# Patient Record
Sex: FEMALE | Race: WHITE | NOT HISPANIC OR LATINO | URBAN - METROPOLITAN AREA
[De-identification: names, ages, dates, MRNs, and addresses within clinical notes are randomized per-mention and may not be internally consistent; named-entity substitution may affect disease eponyms.]

---

## 2017-03-15 ENCOUNTER — INPATIENT (INPATIENT)
Facility: HOSPITAL | Age: 27
LOS: 3 days | Discharge: HOME | End: 2017-03-19
Attending: OBSTETRICS & GYNECOLOGY | Admitting: OBSTETRICS & GYNECOLOGY
Payer: COMMERCIAL

## 2017-03-15 VITALS — HEIGHT: 68 IN | WEIGHT: 169.76 LBS

## 2017-03-15 DIAGNOSIS — O34.21 MATERNAL CARE FOR SCAR FROM PREVIOUS CESAREAN DELIVERY: ICD-10-CM

## 2017-03-15 DIAGNOSIS — O34.219 MATERNAL CARE FOR UNSPECIFIED TYPE SCAR FROM PREVIOUS CESAREAN DELIVERY: ICD-10-CM

## 2017-03-15 LAB
BASOPHILS # BLD AUTO: 0 K/UL — SIGNIFICANT CHANGE UP (ref 0–0.2)
BASOPHILS NFR BLD AUTO: 0.2 % — SIGNIFICANT CHANGE UP (ref 0–2)
BLD GP AB SCN SERPL QL: NEGATIVE — SIGNIFICANT CHANGE UP
EOSINOPHIL # BLD AUTO: 0 K/UL — SIGNIFICANT CHANGE UP (ref 0–0.5)
EOSINOPHIL NFR BLD AUTO: 0.3 % — SIGNIFICANT CHANGE UP (ref 0–6)
HCT VFR BLD CALC: 39.7 % — SIGNIFICANT CHANGE UP (ref 34.5–45)
HGB BLD-MCNC: 13.5 G/DL — SIGNIFICANT CHANGE UP (ref 11.5–15.5)
LYMPHOCYTES # BLD AUTO: 18.3 % — SIGNIFICANT CHANGE UP (ref 13–44)
LYMPHOCYTES # BLD AUTO: 2 K/UL — SIGNIFICANT CHANGE UP (ref 1–3.3)
MCHC RBC-ENTMCNC: 31.5 PG — SIGNIFICANT CHANGE UP (ref 27–34)
MCHC RBC-ENTMCNC: 34 GM/DL — SIGNIFICANT CHANGE UP (ref 32–36)
MCV RBC AUTO: 92.6 FL — SIGNIFICANT CHANGE UP (ref 80–100)
MONOCYTES # BLD AUTO: 0.6 K/UL — SIGNIFICANT CHANGE UP (ref 0–0.9)
MONOCYTES NFR BLD AUTO: 5.7 % — SIGNIFICANT CHANGE UP (ref 2–14)
NEUTROPHILS # BLD AUTO: 8.4 K/UL — HIGH (ref 1.8–7.4)
NEUTROPHILS NFR BLD AUTO: 75.5 % — SIGNIFICANT CHANGE UP (ref 43–77)
PLATELET # BLD AUTO: 160 K/UL — SIGNIFICANT CHANGE UP (ref 150–400)
RBC # BLD: 4.28 M/UL — SIGNIFICANT CHANGE UP (ref 3.8–5.2)
RBC # FLD: 12.1 % — SIGNIFICANT CHANGE UP (ref 10.3–14.5)
RH IG SCN BLD-IMP: POSITIVE — SIGNIFICANT CHANGE UP
RH IG SCN BLD-IMP: POSITIVE — SIGNIFICANT CHANGE UP
T PALLIDUM AB TITR SER: NEGATIVE — SIGNIFICANT CHANGE UP
WBC # BLD: 11.1 K/UL — HIGH (ref 3.8–10.5)
WBC # FLD AUTO: 11.1 K/UL — HIGH (ref 3.8–10.5)

## 2017-03-15 PROCEDURE — 59514 CESAREAN DELIVERY ONLY: CPT | Mod: AS

## 2017-03-15 RX ORDER — SODIUM CHLORIDE 9 MG/ML
1000 INJECTION, SOLUTION INTRAVENOUS
Qty: 0 | Refills: 0 | Status: DISCONTINUED | OUTPATIENT
Start: 2017-03-15 | End: 2017-03-15

## 2017-03-15 RX ORDER — OXYTOCIN 10 UNIT/ML
333.33 VIAL (ML) INJECTION
Qty: 20 | Refills: 0 | Status: DISCONTINUED | OUTPATIENT
Start: 2017-03-15 | End: 2017-03-15

## 2017-03-15 RX ORDER — DIPHENHYDRAMINE HCL 50 MG
25 CAPSULE ORAL EVERY 6 HOURS
Qty: 0 | Refills: 0 | Status: DISCONTINUED | OUTPATIENT
Start: 2017-03-15 | End: 2017-03-19

## 2017-03-15 RX ORDER — LANOLIN
1 OINTMENT (GRAM) TOPICAL
Qty: 0 | Refills: 0 | Status: DISCONTINUED | OUTPATIENT
Start: 2017-03-15 | End: 2017-03-19

## 2017-03-15 RX ORDER — DOCUSATE SODIUM 100 MG
100 CAPSULE ORAL
Qty: 0 | Refills: 0 | Status: DISCONTINUED | OUTPATIENT
Start: 2017-03-15 | End: 2017-03-19

## 2017-03-15 RX ORDER — METOCLOPRAMIDE HCL 10 MG
10 TABLET ORAL ONCE
Qty: 0 | Refills: 0 | Status: DISCONTINUED | OUTPATIENT
Start: 2017-03-15 | End: 2017-03-15

## 2017-03-15 RX ORDER — HYDROMORPHONE HYDROCHLORIDE 2 MG/ML
30 INJECTION INTRAMUSCULAR; INTRAVENOUS; SUBCUTANEOUS
Qty: 0 | Refills: 0 | Status: DISCONTINUED | OUTPATIENT
Start: 2017-03-15 | End: 2017-03-16

## 2017-03-15 RX ORDER — SODIUM CHLORIDE 9 MG/ML
1000 INJECTION, SOLUTION INTRAVENOUS
Qty: 0 | Refills: 0 | Status: DISCONTINUED | OUTPATIENT
Start: 2017-03-15 | End: 2017-03-19

## 2017-03-15 RX ORDER — GLYCERIN ADULT
1 SUPPOSITORY, RECTAL RECTAL AT BEDTIME
Qty: 0 | Refills: 0 | Status: DISCONTINUED | OUTPATIENT
Start: 2017-03-15 | End: 2017-03-19

## 2017-03-15 RX ORDER — SIMETHICONE 80 MG/1
80 TABLET, CHEWABLE ORAL EVERY 4 HOURS
Qty: 0 | Refills: 0 | Status: DISCONTINUED | OUTPATIENT
Start: 2017-03-15 | End: 2017-03-19

## 2017-03-15 RX ORDER — SODIUM CHLORIDE 9 MG/ML
1000 INJECTION, SOLUTION INTRAVENOUS ONCE
Qty: 0 | Refills: 0 | Status: COMPLETED | OUTPATIENT
Start: 2017-03-15 | End: 2017-03-15

## 2017-03-15 RX ORDER — IBUPROFEN 200 MG
600 TABLET ORAL EVERY 6 HOURS
Qty: 0 | Refills: 0 | Status: DISCONTINUED | OUTPATIENT
Start: 2017-03-16 | End: 2017-03-19

## 2017-03-15 RX ORDER — OXYCODONE HYDROCHLORIDE 5 MG/1
5 TABLET ORAL
Qty: 0 | Refills: 0 | Status: DISCONTINUED | OUTPATIENT
Start: 2017-03-16 | End: 2017-03-19

## 2017-03-15 RX ORDER — OXYTOCIN 10 UNIT/ML
41.67 VIAL (ML) INJECTION
Qty: 20 | Refills: 0 | Status: DISCONTINUED | OUTPATIENT
Start: 2017-03-15 | End: 2017-03-19

## 2017-03-15 RX ORDER — FERROUS SULFATE 325(65) MG
325 TABLET ORAL DAILY
Qty: 0 | Refills: 0 | Status: DISCONTINUED | OUTPATIENT
Start: 2017-03-15 | End: 2017-03-19

## 2017-03-15 RX ORDER — KETOROLAC TROMETHAMINE 30 MG/ML
30 SYRINGE (ML) INJECTION EVERY 6 HOURS
Qty: 0 | Refills: 0 | Status: DISCONTINUED | OUTPATIENT
Start: 2017-03-15 | End: 2017-03-19

## 2017-03-15 RX ORDER — NALOXONE HYDROCHLORIDE 4 MG/.1ML
0.1 SPRAY NASAL
Qty: 0 | Refills: 0 | Status: DISCONTINUED | OUTPATIENT
Start: 2017-03-15 | End: 2017-03-16

## 2017-03-15 RX ORDER — FAMOTIDINE 10 MG/ML
20 INJECTION INTRAVENOUS ONCE
Qty: 0 | Refills: 0 | Status: COMPLETED | OUTPATIENT
Start: 2017-03-15 | End: 2017-03-15

## 2017-03-15 RX ORDER — ACETAMINOPHEN 500 MG
975 TABLET ORAL EVERY 6 HOURS
Qty: 0 | Refills: 0 | Status: DISCONTINUED | OUTPATIENT
Start: 2017-03-15 | End: 2017-03-19

## 2017-03-15 RX ORDER — TETANUS TOXOID, REDUCED DIPHTHERIA TOXOID AND ACELLULAR PERTUSSIS VACCINE, ADSORBED 5; 2.5; 8; 8; 2.5 [IU]/.5ML; [IU]/.5ML; UG/.5ML; UG/.5ML; UG/.5ML
0.5 SUSPENSION INTRAMUSCULAR ONCE
Qty: 0 | Refills: 0 | Status: DISCONTINUED | OUTPATIENT
Start: 2017-03-15 | End: 2017-03-19

## 2017-03-15 RX ORDER — INFLUENZA VIRUS VACCINE 15; 15; 15; 15 UG/.5ML; UG/.5ML; UG/.5ML; UG/.5ML
0.5 SUSPENSION INTRAMUSCULAR ONCE
Qty: 0 | Refills: 0 | Status: DISCONTINUED | OUTPATIENT
Start: 2017-03-15 | End: 2017-03-19

## 2017-03-15 RX ORDER — CITRIC ACID/SODIUM CITRATE 300-500 MG
30 SOLUTION, ORAL ORAL ONCE
Qty: 0 | Refills: 0 | Status: COMPLETED | OUTPATIENT
Start: 2017-03-15 | End: 2017-03-15

## 2017-03-15 RX ORDER — OXYTOCIN 10 UNIT/ML
41.67 VIAL (ML) INJECTION
Qty: 20 | Refills: 0 | Status: DISCONTINUED | OUTPATIENT
Start: 2017-03-15 | End: 2017-03-15

## 2017-03-15 RX ORDER — OXYCODONE HYDROCHLORIDE 5 MG/1
5 TABLET ORAL EVERY 4 HOURS
Qty: 0 | Refills: 0 | Status: DISCONTINUED | OUTPATIENT
Start: 2017-03-16 | End: 2017-03-16

## 2017-03-15 RX ORDER — ONDANSETRON 8 MG/1
4 TABLET, FILM COATED ORAL EVERY 6 HOURS
Qty: 0 | Refills: 0 | Status: DISCONTINUED | OUTPATIENT
Start: 2017-03-15 | End: 2017-03-16

## 2017-03-15 RX ORDER — HYDROMORPHONE HYDROCHLORIDE 2 MG/ML
0.5 INJECTION INTRAMUSCULAR; INTRAVENOUS; SUBCUTANEOUS
Qty: 0 | Refills: 0 | Status: DISCONTINUED | OUTPATIENT
Start: 2017-03-15 | End: 2017-03-16

## 2017-03-15 RX ADMIN — Medication 975 MILLIGRAM(S): at 14:20

## 2017-03-15 RX ADMIN — Medication 1000 MILLIUNIT(S)/MIN: at 13:32

## 2017-03-15 RX ADMIN — Medication 30 MILLIGRAM(S): at 16:05

## 2017-03-15 RX ADMIN — HYDROMORPHONE HYDROCHLORIDE 30 MILLILITER(S): 2 INJECTION INTRAMUSCULAR; INTRAVENOUS; SUBCUTANEOUS at 11:00

## 2017-03-15 RX ADMIN — Medication 975 MILLIGRAM(S): at 22:09

## 2017-03-15 RX ADMIN — HYDROMORPHONE HYDROCHLORIDE 30 MILLILITER(S): 2 INJECTION INTRAMUSCULAR; INTRAVENOUS; SUBCUTANEOUS at 12:37

## 2017-03-15 RX ADMIN — Medication 30 MILLIGRAM(S): at 22:40

## 2017-03-15 RX ADMIN — SODIUM CHLORIDE 125 MILLILITER(S): 9 INJECTION, SOLUTION INTRAVENOUS at 21:35

## 2017-03-15 RX ADMIN — SODIUM CHLORIDE 2000 MILLILITER(S): 9 INJECTION, SOLUTION INTRAVENOUS at 07:00

## 2017-03-15 RX ADMIN — Medication 30 MILLILITER(S): at 07:45

## 2017-03-15 RX ADMIN — SIMETHICONE 80 MILLIGRAM(S): 80 TABLET, CHEWABLE ORAL at 21:31

## 2017-03-15 RX ADMIN — Medication 30 MILLIGRAM(S): at 22:09

## 2017-03-15 RX ADMIN — FAMOTIDINE 20 MILLIGRAM(S): 10 INJECTION INTRAVENOUS at 07:45

## 2017-03-15 RX ADMIN — Medication 975 MILLIGRAM(S): at 21:32

## 2017-03-15 RX ADMIN — HYDROMORPHONE HYDROCHLORIDE 0.5 MILLIGRAM(S): 2 INJECTION INTRAMUSCULAR; INTRAVENOUS; SUBCUTANEOUS at 18:39

## 2017-03-15 RX ADMIN — Medication 125 MILLIUNIT(S)/MIN: at 13:33

## 2017-03-15 RX ADMIN — SODIUM CHLORIDE 125 MILLILITER(S): 9 INJECTION, SOLUTION INTRAVENOUS at 07:46

## 2017-03-15 NOTE — LACTATION INITIAL EVALUATION - LACTATION INTERVENTIONS
initiate dual electric pump routine/pumping guidelines reviewed verbally as patient has history of success with pumping; encouraged direct breastfeeding when infant condition permits

## 2017-03-16 LAB
HCT VFR BLD CALC: 32.2 % — LOW (ref 34.5–45)
HGB BLD-MCNC: 11 G/DL — LOW (ref 11.5–15.5)
MCHC RBC-ENTMCNC: 31.5 PG — SIGNIFICANT CHANGE UP (ref 27–34)
MCHC RBC-ENTMCNC: 34.2 GM/DL — SIGNIFICANT CHANGE UP (ref 32–36)
MCV RBC AUTO: 92.3 FL — SIGNIFICANT CHANGE UP (ref 80–100)
PLATELET # BLD AUTO: 126 K/UL — LOW (ref 150–400)
RBC # BLD: 3.49 M/UL — LOW (ref 3.8–5.2)
RBC # FLD: 13.5 % — SIGNIFICANT CHANGE UP (ref 10.3–14.5)
WBC # BLD: 9.53 K/UL — SIGNIFICANT CHANGE UP (ref 3.8–10.5)
WBC # FLD AUTO: 9.53 K/UL — SIGNIFICANT CHANGE UP (ref 3.8–10.5)

## 2017-03-16 RX ORDER — OXYCODONE HYDROCHLORIDE 5 MG/1
5 TABLET ORAL EVERY 4 HOURS
Qty: 0 | Refills: 0 | Status: DISCONTINUED | OUTPATIENT
Start: 2017-03-16 | End: 2017-03-19

## 2017-03-16 RX ORDER — HEPARIN SODIUM 5000 [USP'U]/ML
5000 INJECTION INTRAVENOUS; SUBCUTANEOUS EVERY 12 HOURS
Qty: 0 | Refills: 0 | Status: DISCONTINUED | OUTPATIENT
Start: 2017-03-16 | End: 2017-03-19

## 2017-03-16 RX ADMIN — Medication 600 MILLIGRAM(S): at 23:14

## 2017-03-16 RX ADMIN — Medication 600 MILLIGRAM(S): at 18:40

## 2017-03-16 RX ADMIN — Medication 975 MILLIGRAM(S): at 15:48

## 2017-03-16 RX ADMIN — HEPARIN SODIUM 5000 UNIT(S): 5000 INJECTION INTRAVENOUS; SUBCUTANEOUS at 17:20

## 2017-03-16 RX ADMIN — HEPARIN SODIUM 5000 UNIT(S): 5000 INJECTION INTRAVENOUS; SUBCUTANEOUS at 05:12

## 2017-03-16 RX ADMIN — Medication 30 MILLIGRAM(S): at 05:45

## 2017-03-16 RX ADMIN — Medication 30 MILLIGRAM(S): at 05:12

## 2017-03-16 RX ADMIN — SIMETHICONE 80 MILLIGRAM(S): 80 TABLET, CHEWABLE ORAL at 15:46

## 2017-03-16 RX ADMIN — Medication 975 MILLIGRAM(S): at 22:00

## 2017-03-16 RX ADMIN — SODIUM CHLORIDE 125 MILLILITER(S): 9 INJECTION, SOLUTION INTRAVENOUS at 06:18

## 2017-03-16 RX ADMIN — Medication 975 MILLIGRAM(S): at 05:13

## 2017-03-16 RX ADMIN — OXYCODONE HYDROCHLORIDE 5 MILLIGRAM(S): 5 TABLET ORAL at 20:40

## 2017-03-16 RX ADMIN — OXYCODONE HYDROCHLORIDE 5 MILLIGRAM(S): 5 TABLET ORAL at 11:30

## 2017-03-16 RX ADMIN — Medication 25 MILLIGRAM(S): at 23:13

## 2017-03-16 RX ADMIN — Medication 1 TABLET(S): at 12:21

## 2017-03-16 RX ADMIN — OXYCODONE HYDROCHLORIDE 5 MILLIGRAM(S): 5 TABLET ORAL at 15:44

## 2017-03-16 RX ADMIN — OXYCODONE HYDROCHLORIDE 5 MILLIGRAM(S): 5 TABLET ORAL at 10:00

## 2017-03-16 RX ADMIN — Medication 25 MILLIGRAM(S): at 01:36

## 2017-03-16 RX ADMIN — Medication 600 MILLIGRAM(S): at 12:21

## 2017-03-16 RX ADMIN — OXYCODONE HYDROCHLORIDE 5 MILLIGRAM(S): 5 TABLET ORAL at 16:23

## 2017-03-16 RX ADMIN — Medication 975 MILLIGRAM(S): at 16:23

## 2017-03-16 RX ADMIN — Medication 975 MILLIGRAM(S): at 05:45

## 2017-03-16 RX ADMIN — Medication 975 MILLIGRAM(S): at 21:21

## 2017-03-16 RX ADMIN — OXYCODONE HYDROCHLORIDE 5 MILLIGRAM(S): 5 TABLET ORAL at 20:08

## 2017-03-16 RX ADMIN — OXYCODONE HYDROCHLORIDE 5 MILLIGRAM(S): 5 TABLET ORAL at 13:04

## 2017-03-16 RX ADMIN — SIMETHICONE 80 MILLIGRAM(S): 80 TABLET, CHEWABLE ORAL at 20:08

## 2017-03-16 RX ADMIN — Medication 600 MILLIGRAM(S): at 19:20

## 2017-03-16 RX ADMIN — Medication 600 MILLIGRAM(S): at 12:59

## 2017-03-16 RX ADMIN — Medication 100 MILLIGRAM(S): at 20:08

## 2017-03-17 RX ADMIN — Medication 600 MILLIGRAM(S): at 18:00

## 2017-03-17 RX ADMIN — OXYCODONE HYDROCHLORIDE 5 MILLIGRAM(S): 5 TABLET ORAL at 08:40

## 2017-03-17 RX ADMIN — Medication 975 MILLIGRAM(S): at 06:10

## 2017-03-17 RX ADMIN — OXYCODONE HYDROCHLORIDE 5 MILLIGRAM(S): 5 TABLET ORAL at 16:37

## 2017-03-17 RX ADMIN — Medication 975 MILLIGRAM(S): at 05:28

## 2017-03-17 RX ADMIN — HEPARIN SODIUM 5000 UNIT(S): 5000 INJECTION INTRAVENOUS; SUBCUTANEOUS at 17:00

## 2017-03-17 RX ADMIN — OXYCODONE HYDROCHLORIDE 5 MILLIGRAM(S): 5 TABLET ORAL at 16:40

## 2017-03-17 RX ADMIN — Medication 975 MILLIGRAM(S): at 11:40

## 2017-03-17 RX ADMIN — Medication 975 MILLIGRAM(S): at 12:32

## 2017-03-17 RX ADMIN — Medication 600 MILLIGRAM(S): at 17:00

## 2017-03-17 RX ADMIN — Medication 975 MILLIGRAM(S): at 20:22

## 2017-03-17 RX ADMIN — OXYCODONE HYDROCHLORIDE 5 MILLIGRAM(S): 5 TABLET ORAL at 11:40

## 2017-03-17 RX ADMIN — OXYCODONE HYDROCHLORIDE 5 MILLIGRAM(S): 5 TABLET ORAL at 06:10

## 2017-03-17 RX ADMIN — Medication 600 MILLIGRAM(S): at 00:00

## 2017-03-17 RX ADMIN — Medication 100 MILLIGRAM(S): at 05:28

## 2017-03-17 RX ADMIN — OXYCODONE HYDROCHLORIDE 5 MILLIGRAM(S): 5 TABLET ORAL at 09:30

## 2017-03-17 RX ADMIN — OXYCODONE HYDROCHLORIDE 5 MILLIGRAM(S): 5 TABLET ORAL at 05:28

## 2017-03-17 RX ADMIN — HEPARIN SODIUM 5000 UNIT(S): 5000 INJECTION INTRAVENOUS; SUBCUTANEOUS at 05:28

## 2017-03-17 RX ADMIN — OXYCODONE HYDROCHLORIDE 5 MILLIGRAM(S): 5 TABLET ORAL at 12:40

## 2017-03-17 RX ADMIN — Medication 600 MILLIGRAM(S): at 11:00

## 2017-03-17 RX ADMIN — Medication 1 TABLET(S): at 11:29

## 2017-03-17 RX ADMIN — OXYCODONE HYDROCHLORIDE 5 MILLIGRAM(S): 5 TABLET ORAL at 20:23

## 2017-03-17 RX ADMIN — OXYCODONE HYDROCHLORIDE 5 MILLIGRAM(S): 5 TABLET ORAL at 21:00

## 2017-03-17 RX ADMIN — Medication 975 MILLIGRAM(S): at 21:00

## 2017-03-17 RX ADMIN — Medication 100 MILLIGRAM(S): at 11:40

## 2017-03-17 RX ADMIN — Medication 600 MILLIGRAM(S): at 10:07

## 2017-03-18 LAB
HCT VFR BLD CALC: 35 % — SIGNIFICANT CHANGE UP (ref 34.5–45)
HGB BLD-MCNC: 12.1 G/DL — SIGNIFICANT CHANGE UP (ref 11.5–15.5)
MCHC RBC-ENTMCNC: 32.4 PG — SIGNIFICANT CHANGE UP (ref 27–34)
MCHC RBC-ENTMCNC: 34.4 GM/DL — SIGNIFICANT CHANGE UP (ref 32–36)
MCV RBC AUTO: 94 FL — SIGNIFICANT CHANGE UP (ref 80–100)
PLATELET # BLD AUTO: 201 K/UL — SIGNIFICANT CHANGE UP (ref 150–400)
RBC # BLD: 3.73 M/UL — LOW (ref 3.8–5.2)
RBC # FLD: 12 % — SIGNIFICANT CHANGE UP (ref 10.3–14.5)
WBC # BLD: 9.9 K/UL — SIGNIFICANT CHANGE UP (ref 3.8–10.5)
WBC # FLD AUTO: 9.9 K/UL — SIGNIFICANT CHANGE UP (ref 3.8–10.5)

## 2017-03-18 RX ADMIN — Medication 600 MILLIGRAM(S): at 09:00

## 2017-03-18 RX ADMIN — Medication 600 MILLIGRAM(S): at 22:25

## 2017-03-18 RX ADMIN — OXYCODONE HYDROCHLORIDE 5 MILLIGRAM(S): 5 TABLET ORAL at 11:55

## 2017-03-18 RX ADMIN — Medication 975 MILLIGRAM(S): at 18:23

## 2017-03-18 RX ADMIN — Medication 600 MILLIGRAM(S): at 00:25

## 2017-03-18 RX ADMIN — Medication 975 MILLIGRAM(S): at 05:27

## 2017-03-18 RX ADMIN — Medication 600 MILLIGRAM(S): at 14:53

## 2017-03-18 RX ADMIN — HEPARIN SODIUM 5000 UNIT(S): 5000 INJECTION INTRAVENOUS; SUBCUTANEOUS at 17:52

## 2017-03-18 RX ADMIN — Medication 100 MILLIGRAM(S): at 05:27

## 2017-03-18 RX ADMIN — Medication 600 MILLIGRAM(S): at 15:54

## 2017-03-18 RX ADMIN — Medication 975 MILLIGRAM(S): at 17:52

## 2017-03-18 RX ADMIN — Medication 975 MILLIGRAM(S): at 06:08

## 2017-03-18 RX ADMIN — OXYCODONE HYDROCHLORIDE 5 MILLIGRAM(S): 5 TABLET ORAL at 05:26

## 2017-03-18 RX ADMIN — OXYCODONE HYDROCHLORIDE 5 MILLIGRAM(S): 5 TABLET ORAL at 18:23

## 2017-03-18 RX ADMIN — Medication 975 MILLIGRAM(S): at 11:55

## 2017-03-18 RX ADMIN — Medication 600 MILLIGRAM(S): at 21:56

## 2017-03-18 RX ADMIN — Medication 1 TABLET(S): at 11:54

## 2017-03-18 RX ADMIN — Medication 600 MILLIGRAM(S): at 10:10

## 2017-03-18 RX ADMIN — OXYCODONE HYDROCHLORIDE 5 MILLIGRAM(S): 5 TABLET ORAL at 22:25

## 2017-03-18 RX ADMIN — Medication 25 MILLIGRAM(S): at 00:25

## 2017-03-18 RX ADMIN — OXYCODONE HYDROCHLORIDE 5 MILLIGRAM(S): 5 TABLET ORAL at 13:00

## 2017-03-18 RX ADMIN — OXYCODONE HYDROCHLORIDE 5 MILLIGRAM(S): 5 TABLET ORAL at 17:52

## 2017-03-18 RX ADMIN — Medication 975 MILLIGRAM(S): at 13:00

## 2017-03-18 RX ADMIN — Medication 325 MILLIGRAM(S): at 11:54

## 2017-03-18 RX ADMIN — OXYCODONE HYDROCHLORIDE 5 MILLIGRAM(S): 5 TABLET ORAL at 21:56

## 2017-03-18 RX ADMIN — HEPARIN SODIUM 5000 UNIT(S): 5000 INJECTION INTRAVENOUS; SUBCUTANEOUS at 05:26

## 2017-03-18 RX ADMIN — OXYCODONE HYDROCHLORIDE 5 MILLIGRAM(S): 5 TABLET ORAL at 06:08

## 2017-03-18 RX ADMIN — Medication 100 MILLIGRAM(S): at 11:54

## 2017-03-18 RX ADMIN — Medication 600 MILLIGRAM(S): at 01:19

## 2017-03-18 NOTE — DISCHARGE NOTE OB - MEDICATION SUMMARY - MEDICATIONS TO TAKE
I will START or STAY ON the medications listed below when I get home from the hospital:    Percocet 5/325 325 mg-5 mg oral tablet  -- 1 tab(s) by mouth every 4 hours, As Needed -for severe pain MDD:6  -- Caution federal law prohibits the transfer of this drug to any person other  than the person for whom it was prescribed.  May cause drowsiness.  Alcohol may intensify this effect.  Use care when operating dangerous machinery.  This prescription cannot be refilled.  This product contains acetaminophen.  Do not use  with any other product containing acetaminophen to prevent possible liver damage.  Using more of this medication than prescribed may cause serious breathing problems.    -- Indication: For Maternal care for scar from previous  delivery    acetaminophen 325 mg oral tablet  -- 3 tab(s) by mouth every 6 hours  -- Indication: For Maternal care for scar from previous  delivery    ibuprofen 600 mg oral tablet  -- 1 tab(s) by mouth every 6 hours  -- Indication: For Maternal care for scar from previous  delivery    Prenatal Multivitamins with Folic Acid 1 mg oral tablet  -- 1 tab(s) by mouth once a day  -- Indication: For Maternal care for scar from previous  delivery

## 2017-03-18 NOTE — DISCHARGE NOTE OB - PATIENT PORTAL LINK FT
“You can access the FollowHealth Patient Portal, offered by [facility], by registering with the following website: [email or web address]”

## 2017-03-18 NOTE — DISCHARGE NOTE OB - HOSPITAL COURSE
Term IUG admitted for a repeat c/s. Unremarkable post op course. Baby being managed in NICU for seizure activity.

## 2017-03-18 NOTE — DISCHARGE NOTE OB - MATERIALS PROVIDED
Guide to Postpartum Care/Breastfeeding Guide and Packet/Birth Certificate Instructions/Breastfeeding Mother’s Support Group Information/Breastfeeding Log/Back To Sleep Handout/[place] Hearing Screen Program Guide to Postpartum Care/[facility] Hearing Screen Program/Breastfeeding Log/Back To Sleep Handout/Birth Certificate Instructions/Breastfeeding Mother’s Support Group Information/Breastfeeding Guide and Packet/[facility] [place] Screening Program/NB screen when baby discharged

## 2017-03-18 NOTE — DISCHARGE NOTE OB - CARE PROVIDER_API CALL
Thomas Arevalo (MD), Obstetrics and Gynecology  [address] [place] [address]  Phone: [phone]  Fax: [phone]

## 2017-03-18 NOTE — DISCHARGE NOTE OB - CARE PLAN
Principal Discharge DX:	Term birth of   Goal:	Routine pp  Instructions for follow-up, activity and diet:	Office appt in 2 weeks. Regular diet and activity as directed

## 2017-03-19 VITALS
RESPIRATION RATE: 18 BRPM | TEMPERATURE: 98 F | OXYGEN SATURATION: 98 % | SYSTOLIC BLOOD PRESSURE: 123 MMHG | DIASTOLIC BLOOD PRESSURE: 71 MMHG | HEART RATE: 89 BPM

## 2017-03-19 RX ORDER — IBUPROFEN 200 MG
1 TABLET ORAL
Qty: 0 | Refills: 0 | DISCHARGE
Start: 2017-03-19

## 2017-03-19 RX ORDER — OXYCODONE AND ACETAMINOPHEN 5; 325 MG/1; MG/1
1 TABLET ORAL
Qty: 30 | Refills: 0
Start: 2017-03-19

## 2017-03-19 RX ORDER — ACETAMINOPHEN 500 MG
3 TABLET ORAL
Qty: 0 | Refills: 0 | DISCHARGE
Start: 2017-03-19

## 2017-03-19 RX ADMIN — Medication 600 MILLIGRAM(S): at 12:08

## 2017-03-19 RX ADMIN — Medication 975 MILLIGRAM(S): at 05:35

## 2017-03-19 RX ADMIN — HEPARIN SODIUM 5000 UNIT(S): 5000 INJECTION INTRAVENOUS; SUBCUTANEOUS at 05:35

## 2017-03-19 RX ADMIN — Medication 1 TABLET(S): at 12:08

## 2017-03-19 RX ADMIN — OXYCODONE HYDROCHLORIDE 5 MILLIGRAM(S): 5 TABLET ORAL at 06:05

## 2017-03-19 RX ADMIN — OXYCODONE HYDROCHLORIDE 5 MILLIGRAM(S): 5 TABLET ORAL at 00:23

## 2017-03-19 RX ADMIN — Medication 325 MILLIGRAM(S): at 12:08

## 2017-03-19 RX ADMIN — Medication 975 MILLIGRAM(S): at 00:23

## 2017-03-19 RX ADMIN — Medication 975 MILLIGRAM(S): at 00:55

## 2017-03-19 RX ADMIN — OXYCODONE HYDROCHLORIDE 5 MILLIGRAM(S): 5 TABLET ORAL at 00:55

## 2017-03-19 RX ADMIN — Medication 975 MILLIGRAM(S): at 06:05

## 2017-03-19 RX ADMIN — Medication 600 MILLIGRAM(S): at 13:29

## 2017-03-19 RX ADMIN — OXYCODONE HYDROCHLORIDE 5 MILLIGRAM(S): 5 TABLET ORAL at 05:35

## 2017-03-30 ENCOUNTER — TRANSCRIPTION ENCOUNTER (OUTPATIENT)
Age: 27
End: 2017-03-30

## 2017-08-10 NOTE — DISCHARGE NOTE OB - SWOLLEN AREA ON THE LEG THAT IS PAINFUL, RED OR HOT
Secondary Intention Text (Leave Blank If You Do Not Want): The defect will heal with secondary intention. Statement Selected

## 2020-12-26 ENCOUNTER — EMERGENCY (EMERGENCY)
Facility: HOSPITAL | Age: 30
LOS: 1 days | Discharge: HOME | End: 2020-12-26
Attending: EMERGENCY MEDICINE | Admitting: EMERGENCY MEDICINE
Payer: COMMERCIAL

## 2020-12-26 VITALS
RESPIRATION RATE: 17 BRPM | SYSTOLIC BLOOD PRESSURE: 177 MMHG | HEART RATE: 111 BPM | TEMPERATURE: 98 F | DIASTOLIC BLOOD PRESSURE: 116 MMHG | OXYGEN SATURATION: 98 % | WEIGHT: 160.06 LBS

## 2020-12-26 DIAGNOSIS — Z98.891 HISTORY OF UTERINE SCAR FROM PREVIOUS SURGERY: Chronic | ICD-10-CM

## 2020-12-26 LAB
ALBUMIN SERPL ELPH-MCNC: 4.2 G/DL — SIGNIFICANT CHANGE UP (ref 3.3–5)
ALP SERPL-CCNC: 97 U/L — SIGNIFICANT CHANGE UP (ref 40–120)
ALT FLD-CCNC: 51 U/L — SIGNIFICANT CHANGE UP (ref 12–78)
ANION GAP SERPL CALC-SCNC: 6 MMOL/L — SIGNIFICANT CHANGE UP (ref 5–17)
APPEARANCE UR: CLEAR — SIGNIFICANT CHANGE UP
AST SERPL-CCNC: 36 U/L — SIGNIFICANT CHANGE UP (ref 15–37)
BASOPHILS # BLD AUTO: 0.03 K/UL — SIGNIFICANT CHANGE UP (ref 0–0.2)
BASOPHILS NFR BLD AUTO: 0.4 % — SIGNIFICANT CHANGE UP (ref 0–2)
BILIRUB SERPL-MCNC: 0.8 MG/DL — SIGNIFICANT CHANGE UP (ref 0.2–1.2)
BILIRUB UR-MCNC: NEGATIVE — SIGNIFICANT CHANGE UP
BUN SERPL-MCNC: 11 MG/DL — SIGNIFICANT CHANGE UP (ref 7–23)
CALCIUM SERPL-MCNC: 8.9 MG/DL — SIGNIFICANT CHANGE UP (ref 8.5–10.1)
CHLORIDE SERPL-SCNC: 105 MMOL/L — SIGNIFICANT CHANGE UP (ref 96–108)
CO2 SERPL-SCNC: 28 MMOL/L — SIGNIFICANT CHANGE UP (ref 22–31)
COLOR SPEC: ABNORMAL
CREAT SERPL-MCNC: 0.73 MG/DL — SIGNIFICANT CHANGE UP (ref 0.5–1.3)
DIFF PNL FLD: ABNORMAL
EOSINOPHIL # BLD AUTO: 0.01 K/UL — SIGNIFICANT CHANGE UP (ref 0–0.5)
EOSINOPHIL NFR BLD AUTO: 0.1 % — SIGNIFICANT CHANGE UP (ref 0–6)
GLUCOSE SERPL-MCNC: 107 MG/DL — HIGH (ref 70–99)
GLUCOSE UR QL: NEGATIVE — SIGNIFICANT CHANGE UP
HCG SERPL-ACNC: <1 MIU/ML — SIGNIFICANT CHANGE UP
HCT VFR BLD CALC: 45.6 % — HIGH (ref 34.5–45)
HGB BLD-MCNC: 15.7 G/DL — HIGH (ref 11.5–15.5)
IMM GRANULOCYTES NFR BLD AUTO: 0.2 % — SIGNIFICANT CHANGE UP (ref 0–1.5)
KETONES UR-MCNC: NEGATIVE — SIGNIFICANT CHANGE UP
LEUKOCYTE ESTERASE UR-ACNC: NEGATIVE — SIGNIFICANT CHANGE UP
LIDOCAIN IGE QN: 90 U/L — SIGNIFICANT CHANGE UP (ref 73–393)
LYMPHOCYTES # BLD AUTO: 1.37 K/UL — SIGNIFICANT CHANGE UP (ref 1–3.3)
LYMPHOCYTES # BLD AUTO: 16.9 % — SIGNIFICANT CHANGE UP (ref 13–44)
MCHC RBC-ENTMCNC: 31.8 PG — SIGNIFICANT CHANGE UP (ref 27–34)
MCHC RBC-ENTMCNC: 34.4 GM/DL — SIGNIFICANT CHANGE UP (ref 32–36)
MCV RBC AUTO: 92.5 FL — SIGNIFICANT CHANGE UP (ref 80–100)
MONOCYTES # BLD AUTO: 0.63 K/UL — SIGNIFICANT CHANGE UP (ref 0–0.9)
MONOCYTES NFR BLD AUTO: 7.8 % — SIGNIFICANT CHANGE UP (ref 2–14)
NEUTROPHILS # BLD AUTO: 6.05 K/UL — SIGNIFICANT CHANGE UP (ref 1.8–7.4)
NEUTROPHILS NFR BLD AUTO: 74.6 % — SIGNIFICANT CHANGE UP (ref 43–77)
NITRITE UR-MCNC: NEGATIVE — SIGNIFICANT CHANGE UP
NRBC # BLD: 0 /100 WBCS — SIGNIFICANT CHANGE UP (ref 0–0)
PH UR: 5 — SIGNIFICANT CHANGE UP (ref 5–8)
PLATELET # BLD AUTO: 237 K/UL — SIGNIFICANT CHANGE UP (ref 150–400)
POTASSIUM SERPL-MCNC: 3.8 MMOL/L — SIGNIFICANT CHANGE UP (ref 3.5–5.3)
POTASSIUM SERPL-SCNC: 3.8 MMOL/L — SIGNIFICANT CHANGE UP (ref 3.5–5.3)
PROT SERPL-MCNC: 8.4 G/DL — HIGH (ref 6–8.3)
PROT UR-MCNC: 15
RBC # BLD: 4.93 M/UL — SIGNIFICANT CHANGE UP (ref 3.8–5.2)
RBC # FLD: 12.6 % — SIGNIFICANT CHANGE UP (ref 10.3–14.5)
SODIUM SERPL-SCNC: 139 MMOL/L — SIGNIFICANT CHANGE UP (ref 135–145)
SP GR SPEC: 1.01 — SIGNIFICANT CHANGE UP (ref 1.01–1.02)
UROBILINOGEN FLD QL: NEGATIVE — SIGNIFICANT CHANGE UP
WBC # BLD: 8.11 K/UL — SIGNIFICANT CHANGE UP (ref 3.8–10.5)
WBC # FLD AUTO: 8.11 K/UL — SIGNIFICANT CHANGE UP (ref 3.8–10.5)

## 2020-12-26 PROCEDURE — 85025 COMPLETE CBC W/AUTO DIFF WBC: CPT

## 2020-12-26 PROCEDURE — 99285 EMERGENCY DEPT VISIT HI MDM: CPT

## 2020-12-26 PROCEDURE — 83690 ASSAY OF LIPASE: CPT

## 2020-12-26 PROCEDURE — 81001 URINALYSIS AUTO W/SCOPE: CPT

## 2020-12-26 PROCEDURE — 99284 EMERGENCY DEPT VISIT MOD MDM: CPT | Mod: 25

## 2020-12-26 PROCEDURE — 84702 CHORIONIC GONADOTROPIN TEST: CPT

## 2020-12-26 PROCEDURE — 80053 COMPREHEN METABOLIC PANEL: CPT

## 2020-12-26 PROCEDURE — 96375 TX/PRO/DX INJ NEW DRUG ADDON: CPT

## 2020-12-26 PROCEDURE — 74176 CT ABD & PELVIS W/O CONTRAST: CPT

## 2020-12-26 PROCEDURE — 36415 COLL VENOUS BLD VENIPUNCTURE: CPT

## 2020-12-26 PROCEDURE — 74176 CT ABD & PELVIS W/O CONTRAST: CPT | Mod: 26

## 2020-12-26 PROCEDURE — 96374 THER/PROPH/DIAG INJ IV PUSH: CPT

## 2020-12-26 RX ORDER — KETOROLAC TROMETHAMINE 30 MG/ML
30 SYRINGE (ML) INJECTION ONCE
Refills: 0 | Status: DISCONTINUED | OUTPATIENT
Start: 2020-12-26 | End: 2020-12-26

## 2020-12-26 RX ORDER — ONDANSETRON 8 MG/1
4 TABLET, FILM COATED ORAL ONCE
Refills: 0 | Status: COMPLETED | OUTPATIENT
Start: 2020-12-26 | End: 2020-12-26

## 2020-12-26 RX ORDER — SODIUM CHLORIDE 9 MG/ML
1000 INJECTION INTRAMUSCULAR; INTRAVENOUS; SUBCUTANEOUS ONCE
Refills: 0 | Status: COMPLETED | OUTPATIENT
Start: 2020-12-26 | End: 2020-12-26

## 2020-12-26 RX ORDER — CYCLOBENZAPRINE HYDROCHLORIDE 10 MG/1
1 TABLET, FILM COATED ORAL
Qty: 14 | Refills: 0
Start: 2020-12-26 | End: 2021-01-01

## 2020-12-26 RX ADMIN — ONDANSETRON 4 MILLIGRAM(S): 8 TABLET, FILM COATED ORAL at 12:19

## 2020-12-26 RX ADMIN — Medication 30 MILLIGRAM(S): at 13:33

## 2020-12-26 RX ADMIN — SODIUM CHLORIDE 1000 MILLILITER(S): 9 INJECTION INTRAMUSCULAR; INTRAVENOUS; SUBCUTANEOUS at 12:19

## 2020-12-26 NOTE — ED PROVIDER NOTE - CLINICAL SUMMARY MEDICAL DECISION MAKING FREE TEXT BOX
c/o back pain radiating to left lower abdomen. LMP currently. plan includes labs, UA r/o UTI, CT renal stone hunt, pain control, antiemetic, re-assess

## 2020-12-26 NOTE — ED PROVIDER NOTE - ATTENDING CONTRIBUTION TO CARE
I have personally performed a face to face diagnostic evaluation on this patient.  I have reviewed the PA note and agree with the history, exam, and plan of care, except as noted.  History and Exam by me shows patient c/o low back pain, LLQ pain for 3 days, patient alert and oriented, afebrile, abdomen soft, heart and lungs clear, f/u labs, CT abdomen/pelvis, ua, pain control, re-eval.

## 2020-12-26 NOTE — ED PROVIDER NOTE - OBJECTIVE STATEMENT
31 y/o female without reported PMHx presents today c/o back pain x 3 days. pt reports waking up with lower back pain, throbbing, radiating to left lower abdomen, constant, and currently 8/10. pt reports she took Aleve at 2 am today. pt reports waking up Thursday with worse pain, and sweating. pt reports she is uncertain if she has hematuria or from menstruation currently. Pt admits to nausea. pt reports she did not come yesterday as it was [place]. pt denies trauma/fall, incontinence, dysuria, frequency, chest pain, SOB, diarrhea, hematochezia, or any other complaints.

## 2020-12-26 NOTE — ED PROVIDER NOTE - PROVIDER TOKENS
PROVIDER:[TOKEN:[[id number]],FOLLOWUP:[1-3 Days]],PROVIDER:[TOKEN:[[id number]],FOLLOWUP:[1-3 Days]]

## 2020-12-26 NOTE — ED PROVIDER NOTE - PHYSICAL EXAMINATION
Constitutional: Awake, Alert, non-toxic. NAD. Well appearing, well nourished.   HEAD: Normocephalic, atraumatic.   EYES: EOM intact, conjunctiva and sclera are clear bilaterally.   ENT: No rhinorrhea, patent, mucous membranes pink/moist, no drooling or stridor.   NECK: Supple, non-tender  CARDIOVASCULAR: Normal S1, S2; regular rate and rhythm.  RESPIRATORY: Normal respiratory effort; breath sounds CTAB, no wheezes, rhonchi, or rales. Speaking in full sentences. No accessory muscle use.   ABDOMEN: Soft; (+) LLQ TTP, (+) guarding, no rebound TTP, negative cva TTP.   EXTREMITIES: Full passive and active ROM in all extremities; non-tender to palpation; distal pulses palpable and symmetric, no spinal midline TTP of cervical/thoracic/lumbar. FROM. no hip TTP.   SKIN: Warm, dry; good skin turgor, no apparent lesions or rashes, no ecchymosis, brisk capillary refill.  NEURO: A&O x3. Sensory and motor functions are grossly intact. Speech is normal. Appearance and judgement seem appropriate for gender and age.

## 2020-12-26 NOTE — ED PROVIDER NOTE - CARE PROVIDER_API CALL
Iam Christianson)  Urology  [address], [address]  [place] [address]  Phone: [phone]  Fax: [phone]  Follow Up Time: 1-3 Days    Jay Pirce ()  Internal Medicine  [address] [place] [address]  Phone: [phone]  Fax: [phone]  Follow Up Time: 1-3 Days

## 2020-12-26 NOTE — ED PROVIDER NOTE - NSFOLLOWUPINSTRUCTIONS_ED_ALL_ED_FT
Follow up with your primary care doctor. Be cautious when taking Flexeril as it may cause drowsiness. Do not drive or operate machinery when taking Flexeril. NSAID Naproxen was sent to your pharmacy. Return for any worsening condition, vomiting, fever, difficulty urinating, incontinence, numbness/weakness, etc     Flank Pain, Adult    Flank pain is pain that is located on the side of the body between the upper abdomen and the back. This area is called the flank. The pain may occur over a short period of time (acute), or it may be long-term or recurring (chronic). It may be mild or severe. Flank pain can be caused by many things, including:  •Muscle soreness or injury.      •Kidney stones or kidney disease.      •Stress.      •A disease of the spine (vertebral disk disease).      •A lung infection (pneumonia).      •Fluid around the lungs (pulmonary edema).      •A skin rash caused by the chickenpox virus (shingles).      •Tumors that affect the back of the abdomen.      •Gallbladder disease.        Follow these instructions at home:     •Drink enough fluid to keep your urine clear or pale yellow.      •Rest as told by your health care provider.      •Take over-the-counter and prescription medicines only as told by your health care provider.      •Keep a journal to track what has caused your flank pain and what has made it feel better.      •Keep all follow-up visits as told by your health care provider. This is important.        Contact a health care provider if:    •Your pain is not controlled with medicine.      •You have new symptoms.      •Your pain gets worse.      •You have a fever.      •Your symptoms last longer than 2–3 days.      •You have trouble urinating or you are urinating very frequently.        Get help right away if:    •You have trouble breathing or you are short of breath.      •Your abdomen hurts or it is swollen or red.      •You have nausea or vomiting.      •You feel faint or you pass out.      •You have blood in your urine.        Summary    •Flank pain is pain that is located on the side of the body between the upper abdomen and the back.      •The pain may occur over a short period of time (acute), or it may be long-term or recurring (chronic). It may be mild or severe.      •Flank pain can be caused by many things.      •Contact your health care provider if your symptoms get worse or they last longer than 2–3 days.      This information is not intended to replace advice given to you by your health care provider. Make sure you discuss any questions you have with your health care provider.

## 2020-12-26 NOTE — ED ADULT NURSE NOTE - NS TRANSFER PATIENT BELONGINGS
RX SENT TO PHARMACY    ----- Message from Mariann Fallon MD sent at 9/12/2017  8:33 AM EDT -----  cmp flp hba1c tsh  ----- Message -----     From: Araceli Bennett MA     Sent: 9/11/2017   4:20 PM       To: Mariann Fallon MD    PT SCHEDULED FOR LABS PLEASE ADVISE      
Clothing

## 2020-12-26 NOTE — ED PROVIDER NOTE - PATIENT PORTAL LINK FT
You can access the FollowMyHealth Patient Portal offered by [facility] by registering at the following website: [email or web address]. By joining [employer]’s FollowMyHealth portal, you will also be able to view your health information using other applications (apps) compatible with our system.

## 2023-01-09 NOTE — PATIENT PROFILE OB - CURRENT PREGNANCY COMPLICATIONS, OB PROFILE
Adbry Counseling: I discussed with the patient the risks of tralokinumab including but not limited to eye infection and irritation, cold sores, injection site reactions, worsening of asthma, allergic reactions and increased risk of parasitic infection.  Live vaccines should be avoided while taking tralokinumab. The patient understands that monitoring is required and they must alert us or the primary physician if symptoms of infection or other concerning signs are noted. see L&D summary

## 2023-01-11 ENCOUNTER — TRANSCRIPTION ENCOUNTER (OUTPATIENT)
Age: 33
End: 2023-01-11

## 2023-01-11 ENCOUNTER — INPATIENT (INPATIENT)
Facility: HOSPITAL | Age: 33
LOS: 1 days | Discharge: HOME | DRG: 819 | End: 2023-01-13
Attending: OBSTETRICS & GYNECOLOGY | Admitting: OBSTETRICS & GYNECOLOGY
Payer: COMMERCIAL

## 2023-01-11 VITALS
HEIGHT: 67 IN | RESPIRATION RATE: 20 BRPM | TEMPERATURE: 98 F | WEIGHT: 195.11 LBS | DIASTOLIC BLOOD PRESSURE: 86 MMHG | HEART RATE: 147 BPM | SYSTOLIC BLOOD PRESSURE: 142 MMHG | OXYGEN SATURATION: 99 %

## 2023-01-11 DIAGNOSIS — Z98.891 HISTORY OF UTERINE SCAR FROM PREVIOUS SURGERY: Chronic | ICD-10-CM

## 2023-01-11 DIAGNOSIS — O00.90 UNSPECIFIED ECTOPIC PREGNANCY WITHOUT INTRAUTERINE PREGNANCY: ICD-10-CM

## 2023-01-11 DIAGNOSIS — Z98.890 OTHER SPECIFIED POSTPROCEDURAL STATES: Chronic | ICD-10-CM

## 2023-01-11 PROBLEM — F41.9 ANXIETY DISORDER, UNSPECIFIED: Chronic | Status: ACTIVE | Noted: 2020-12-26

## 2023-01-11 LAB
ABO RH CONFIRMATION: SIGNIFICANT CHANGE UP
ALBUMIN SERPL ELPH-MCNC: 4.1 G/DL — SIGNIFICANT CHANGE UP (ref 3.3–5.2)
ALP SERPL-CCNC: 75 U/L — SIGNIFICANT CHANGE UP (ref 40–120)
ALT FLD-CCNC: 16 U/L — SIGNIFICANT CHANGE UP
ANION GAP SERPL CALC-SCNC: 14 MMOL/L — SIGNIFICANT CHANGE UP (ref 5–17)
APTT BLD: 26.1 SEC — LOW (ref 27.5–35.5)
AST SERPL-CCNC: 20 U/L — SIGNIFICANT CHANGE UP
BASOPHILS # BLD AUTO: 0.04 K/UL — SIGNIFICANT CHANGE UP (ref 0–0.2)
BASOPHILS NFR BLD AUTO: 0.3 % — SIGNIFICANT CHANGE UP (ref 0–2)
BILIRUB SERPL-MCNC: 0.4 MG/DL — SIGNIFICANT CHANGE UP (ref 0.4–2)
BLD GP AB SCN SERPL QL: SIGNIFICANT CHANGE UP
BUN SERPL-MCNC: 10.6 MG/DL — SIGNIFICANT CHANGE UP (ref 8–20)
CALCIUM SERPL-MCNC: 8.6 MG/DL — SIGNIFICANT CHANGE UP (ref 8.4–10.5)
CHLORIDE SERPL-SCNC: 103 MMOL/L — SIGNIFICANT CHANGE UP (ref 96–108)
CO2 SERPL-SCNC: 21 MMOL/L — LOW (ref 22–29)
CREAT SERPL-MCNC: 0.53 MG/DL — SIGNIFICANT CHANGE UP (ref 0.5–1.3)
EGFR: 124 ML/MIN/1.73M2 — SIGNIFICANT CHANGE UP
EOSINOPHIL # BLD AUTO: 0 K/UL — SIGNIFICANT CHANGE UP (ref 0–0.5)
EOSINOPHIL NFR BLD AUTO: 0 % — SIGNIFICANT CHANGE UP (ref 0–6)
FIBRINOGEN PPP-MCNC: 462 MG/DL — SIGNIFICANT CHANGE UP (ref 330–520)
GLUCOSE SERPL-MCNC: 121 MG/DL — HIGH (ref 70–99)
HCG SERPL-ACNC: HIGH MIU/ML
HCT VFR BLD CALC: 28.6 % — LOW (ref 34.5–45)
HGB BLD-MCNC: 9.6 G/DL — LOW (ref 11.5–15.5)
IMM GRANULOCYTES NFR BLD AUTO: 0.5 % — SIGNIFICANT CHANGE UP (ref 0–0.9)
INR BLD: 1.11 RATIO — SIGNIFICANT CHANGE UP (ref 0.88–1.16)
LYMPHOCYTES # BLD AUTO: 1.39 K/UL — SIGNIFICANT CHANGE UP (ref 1–3.3)
LYMPHOCYTES # BLD AUTO: 11 % — LOW (ref 13–44)
MCHC RBC-ENTMCNC: 32.7 PG — SIGNIFICANT CHANGE UP (ref 27–34)
MCHC RBC-ENTMCNC: 33.6 GM/DL — SIGNIFICANT CHANGE UP (ref 32–36)
MCV RBC AUTO: 97.3 FL — SIGNIFICANT CHANGE UP (ref 80–100)
MONOCYTES # BLD AUTO: 0.74 K/UL — SIGNIFICANT CHANGE UP (ref 0–0.9)
MONOCYTES NFR BLD AUTO: 5.9 % — SIGNIFICANT CHANGE UP (ref 2–14)
NEUTROPHILS # BLD AUTO: 10.38 K/UL — HIGH (ref 1.8–7.4)
NEUTROPHILS NFR BLD AUTO: 82.3 % — HIGH (ref 43–77)
PLATELET # BLD AUTO: 255 K/UL — SIGNIFICANT CHANGE UP (ref 150–400)
POTASSIUM SERPL-MCNC: 4.1 MMOL/L — SIGNIFICANT CHANGE UP (ref 3.5–5.3)
POTASSIUM SERPL-SCNC: 4.1 MMOL/L — SIGNIFICANT CHANGE UP (ref 3.5–5.3)
PROT SERPL-MCNC: 6.7 G/DL — SIGNIFICANT CHANGE UP (ref 6.6–8.7)
PROTHROM AB SERPL-ACNC: 12.9 SEC — SIGNIFICANT CHANGE UP (ref 10.5–13.4)
RBC # BLD: 2.94 M/UL — LOW (ref 3.8–5.2)
RBC # FLD: 13.4 % — SIGNIFICANT CHANGE UP (ref 10.3–14.5)
SODIUM SERPL-SCNC: 138 MMOL/L — SIGNIFICANT CHANGE UP (ref 135–145)
T4 AB SER-ACNC: 9 UG/DL — SIGNIFICANT CHANGE UP (ref 4.5–12)
TSH SERPL-MCNC: 1.37 UIU/ML — SIGNIFICANT CHANGE UP (ref 0.27–4.2)
WBC # BLD: 12.61 K/UL — HIGH (ref 3.8–10.5)
WBC # FLD AUTO: 12.61 K/UL — HIGH (ref 3.8–10.5)

## 2023-01-11 PROCEDURE — 76817 TRANSVAGINAL US OBSTETRIC: CPT | Mod: 26

## 2023-01-11 PROCEDURE — 76801 OB US < 14 WKS SINGLE FETUS: CPT | Mod: 26

## 2023-01-11 PROCEDURE — 99222 1ST HOSP IP/OBS MODERATE 55: CPT | Mod: 24

## 2023-01-11 PROCEDURE — 99285 EMERGENCY DEPT VISIT HI MDM: CPT

## 2023-01-11 RX ORDER — SODIUM CHLORIDE 9 MG/ML
1000 INJECTION, SOLUTION INTRAVENOUS
Refills: 0 | Status: DISCONTINUED | OUTPATIENT
Start: 2023-01-11 | End: 2023-01-13

## 2023-01-11 RX ORDER — SODIUM CHLORIDE 9 MG/ML
1000 INJECTION, SOLUTION INTRAVENOUS ONCE
Refills: 0 | Status: COMPLETED | OUTPATIENT
Start: 2023-01-11 | End: 2023-01-11

## 2023-01-11 RX ADMIN — SODIUM CHLORIDE 1000 MILLILITER(S): 9 INJECTION, SOLUTION INTRAVENOUS at 21:35

## 2023-01-11 RX ADMIN — SODIUM CHLORIDE 1000 MILLILITER(S): 9 INJECTION, SOLUTION INTRAVENOUS at 20:02

## 2023-01-11 NOTE — H&P ADULT - NSHPPHYSICALEXAM_GEN_ALL_CORE
Vital Signs Last 24 Hrs  T(C): 36.6 (11 Jan 2023 14:55), Max: 36.6 (11 Jan 2023 14:55)  T(F): 97.9 (11 Jan 2023 14:55), Max: 97.9 (11 Jan 2023 14:55)  HR: 147 (11 Jan 2023 14:55) (147 - 147)  BP: 142/86 (11 Jan 2023 14:55) (142/86 - 142/86)  RR: 20 (11 Jan 2023 14:55) (20 - 20)  SpO2: 99% (11 Jan 2023 14:55) (99% - 99%)    Parameters below as of 11 Jan 2023 14:55  Patient On (Oxygen Delivery Method): room air    General: NAD, well-appearing  Heart: RRR  Lungs: CTAB  Abdominal: soft, non-tender, non-distended, no rebound or guarding, +BS  Ext: non-tender, non-edematous   SSE: cervix visualized, closed and without signs of active vaginal bleeding; dark red blood clot noted in posterior fornix

## 2023-01-11 NOTE — CONSULT NOTE ADULT - SUBJECTIVE AND OBJECTIVE BOX
TRACY KATZ is a 32y  who presented to the ED from OBGYN office for vaginal bleeding. The patient states that she had an LMP on  and then took a home pregnancy test on  that was positive. She had received " medication" from a website called: "[employer]" which directed her to take 1 dose of mifepristone on 2023 and then subsequently was directed to take buccal misoprostol on 2023. She reports that starting on 1/3, she had intermittent heavy vaginal bleeding similar to a heavy period. Starting on , she states that she had increased vaginal bleeding requiring 2-3pads per hour that were fully soaked through. She denies cramping, abdominal pain, fever, and chills at home. She currently reports palpitation and dizziness but denies chest pain and shortness of breath. She states that she had an US done at the office today that showed a gestational sac with no fetal heart detected.       OB history: sAB x1 s/p D&C, TOPx2, pCS 2015  failure to descend complicated by PEC. rCS 2017  GYN history: -fibroids/PCOS HPV + ~10 years ago however reports normal PAP smears since. Denies STIs  Past medical history: anxiety, depression   Past surgical history: D&Cx1 CSx2   Medications: effexor   Allergies: NKDA     Physical Exam  T(C): 36.6 (23 @ 14:55), Max: 36.6 (23 @ 14:55)  HR: 147 (23 @ 14:55) (147 - 147)  BP: 142/86 (23 @ 14:55) (142/86 - 142/86)  RR: 20 (23 @ 14:55) (20 - 20)  SpO2: 99% (23 @ 14:55) (99% - 99%)    General: alert and oriented x3, no acute distress  Cardiovascular:   Respiratory:  Abdominal: no distension, non- tender to palpation.  Extremities:         Labs:         TRACY KATZ is a 32y  who presented to the ED from OBGYN office for vaginal bleeding. The patient states that she had an LMP on  and then took a home pregnancy test on  that was positive. She had received " medication" from a website called: "[employer]" which directed her to take 1 dose of mifepristone on 2023 and then subsequently was directed to take buccal misoprostol on 2023. She reports that starting on 1/3, she had intermittent heavy vaginal bleeding similar to a heavy period. Starting on , she states that she had increased vaginal bleeding requiring 2-3pads per hour that were fully soaked through. She denies cramping, abdominal pain, fever, and chills at home. She currently reports palpitation and dizziness but denies chest pain and shortness of breath. She states that she had an US done at the office today that showed a gestational sac with no fetal heart detected.     OB history: sAB x1 s/p D&C, TOPx2, pCS 2015  failure to descend complicated by PEC. rCS 2017  GYN history: -fibroids/PCOS HPV + ~10 years ago however reports normal PAP smears since. Denies STIs  Past medical history: anxiety, depression   Past surgical history: D&Cx1 CSx2   Medications: effexor   Allergies: NKDA     Physical Exam  T(C): 36.6 (23 @ 14:55), Max: 36.6 (23 @ 14:55)  HR: 147 (23 @ 14:55) (147 - 147)  BP: 142/86 (23 @ 14:55) (142/86 - 142/86)  RR: 20 (23 @ 14:55) (20 - 20)  SpO2: 99% (23 @ 14:55) (99% - 99%)    General: alert and oriented x3, no acute distress  Cardiovascular:   Respiratory:  Abdominal: no distension, non- tender to palpation.  Extremities:     Labs:         TRACY KATZ is a 32y  who presented to the ED from OBGYN office for vaginal bleeding. The patient states that she had an LMP on  and then took a home pregnancy test on  that was positive. She had received " medication" from a website called: "[employer]" which directed her to take 1 dose of mifepristone on 2023 and then subsequently was directed to take buccal misoprostol on 2023. She reports that starting on 1/3, she had intermittent heavy vaginal bleeding similar to a heavy period. Starting on , she states that she had increased vaginal bleeding requiring 2-3pads per hour that were fully soaked through. She denies cramping, abdominal pain, fever, and chills at home. She currently reports palpitation and dizziness but denies chest pain and shortness of breath. She states that she had an US done at the office today that showed a gestational sac with no fetal heart detected.     OB history: sAB x1 s/p D&C, TOPx2, pCS  failure to descend complicated by PEC. rCS 2017  GYN history: -fibroids/PCOS HPV + ~10 years ago however reports normal PAP smears since. Denies STIs  Past medical history: anxiety, depression   Past surgical history: D&Cx1 CSx2   Medications: effexor   Allergies: NKDA     Physical Exam  T(C): 36.6 (23 @ 14:55), Max: 36.6 (23 @ 14:55)  HR: 147 (23 @ 14:55) (147 - 147)  BP: 142/86 (23 @ 14:55) (142/86 - 142/86)  RR: 20 (23 @ 14:55) (20 - 20)  SpO2: 99% (23 @ 14:55) (99% - 99%)    General: alert and oriented x3, no acute distress  Cardiovascular: Tachycardic   Respiratory: resting comfortably on room air   Pelvic: ~50cc of clots expelled from vaginal vault, External os ~ 1cm dilated, internal os not palpated secondary to patient discomfort. Active bleeding noted from cervical os. No cervical motion or adnexal tenderness.  Abdominal: no distension, non- tender to palpation.    < from: 12 Lead ECG (23 @ 18:00) >    Diagnosis Line Sinus tachycardia  Possible Left atrial enlargement  Borderline ECG

## 2023-01-11 NOTE — ED STATDOCS - CARE PLAN
Progress Notes by Jama Dozier MD at 06/15/18 09:39 AM     Author:  Jama Dozier MD Service:  (none) Author Type:  Physician     Filed:  06/15/18 09:44 AM Encounter Date:  6/15/2018 Status:  Signed     :  Jama Dozier MD (Physician)            CC:[PN1.1T] Physical exam[PN1.1M]    SUBJECTIVE:  Eder Michel is a 38 year old male who is here today for an annual exam.[PN1.1T]   Last physical exam was 1 year ago - reports no new updates.  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  Educated at [employer] ('99 - Biology) - played football.  Still works out with running on the treadmill 20 min per day, and lifting weights - 4 times per week.[PN1.1C]  Wants to try out for rugby.  Wondering about a bone spur on his (R) heel.[PN1.1M]     Depression and anxiety started after his divorce, Lexapro working well[PN1.1C], wants to stay on the current dose.[PN1.1M]      No previous lung or heart problems. Non smoker, except for occasional cigar  PSH - T&A      Fam Hx (+) depression and anxiety (fa, bro); no early cardiac deaths, no cancers.  Brother has cardiac surgery for coronary aneurysm (had Kawasaki disease as a child)[PN1.1C]    Patient Active Problem List    Diagnosis    • Adjustment disorder with depressed mood       Current Outpatient Prescriptions     Medication  Sig   • escitalopram (LEXAPRO) 20 MG tablet Take 1 Tab by mouth daily.      Allergies: Review of patient's allergies indicates no known allergies.       Social History        Substance Use Topics        • Smoking status:   Former Smoker      Types:  Cigars    • Smokeless tobacco:   Never Used    • Alcohol use   Yes      Comment: occ       Does patient exercise?[PN1.1T] Yes - Type: as above Frequency: 5 times a week[PN1.1M]  Was counseling given:[PN1.1T] No[PN1.1M]    Family History       Problem   Relation Age of Onset   • High Blood Pressure   Mother    • High Blood Pressure  Father    • OTHER  Father      depression     • * Healthy  Brother    • OTHER  Brother      depression.         Depression Screening:  Over the past 2 weeks, has patient felt down, depressed or hopeless?[PN1.1T] No[PN1.1M]  Over the past 2 weeks, has patient felt little interest or pleasure in doing things?[PN1.1T] No[PN1.1M]    On the basis of the above screen, the following is initiated:[PN1.1T]  Normal screen, continue to monitor for symptoms over time[PN1.1M]    ROS:  General: Pt denies fever, night sweats, weight changes, appetite changes or sleep problems  Heent: Pt denies other problems with head, eyes, ears, nose, mouth, throat and neck  Respiratory: No changes in voice, coughing, wheezing, or shortness of breath  Cardiovascular: No chest pain, palpitations or other cardiac complaints noted  Gastrointestinal: No diarrhea, constipation, abdominal pain or other complaints noted  Genitourinary: Pt. denies urinary pain, bleeding or other voiding problems  Musculoskeletal:[PN1.1T] Ankle bump.[PN1.1M]  Pt. denies other musculoskeletal pain, swelling, weakness or limitation of motion  Neurologic: Pt. denies syncope, seizures, paralysis, involuntary movements or gait problems  Skin: No problems with hair or nails.  No new skin lesions.  No rash  All other systems reviewed are negative      OBJECTIVE:  Filed Vitals:     06/15/18 0903   BP: 120/90   Pulse: 60   Resp: 17   Temp: 96.9 °F (36.1 °C)   TempSrc: Tympanic   SpO2: 97%   Weight: 264 lb (119.7 kg)   Height: 6' 2\" (1.88 m)     Eder's BMI is 33.9, which is[PN1.1T] outside of normal parameters.  Patient counseled on nutrition, exercise and healthy lifestyle.[PN1.1M]    General appearance: alert, appears stated age and cooperative  Head: Normocephalic, without obvious abnormality, atraumatic  Eyes: conjunctivae/corneas clear. PERRL, EOM's intact. Fundi not examined  Ears: normal TM's and external ear canals both ears  Nose: Nares  normal. Septum midline. Mucosa normal. No drainage or sinus tenderness.  Throat: lips, mucosa, and tongue normal; teeth and gums normal  Neck: no adenopathy, no carotid bruit, no JVD, supple, symmetrical, trachea midline and thyroid not enlarged, symmetric, no tenderness/mass/nodules  Back: symmetric, no curvature. ROM normal. No CVA tenderness.  Lungs: clear to auscultation bilaterally  Heart: regular rate and rhythm, S1, S2 normal, no murmur, click, rub or gallop  Abdomen:[PN1.1T] obese;[PN1.1M] soft, non-tender; bowel sounds normal; no masses,  no organomegaly  Extremities:[PN1.1T] (R) heel - 3 cm bony bump posteriorly, non-tender.  Other e[PN1.1M]xtremities normal, atraumatic, no cyanosis or edema   Musculoskeletal: A/P and lateral curves within normal limits.  Pt. has full ROM without difficulty.  Strength 5/5 throughout.  Pulses: 2+ and symmetric  Skin: Skin color, texture, turgor normal. No rashes or lesions  Neurologic: Alert and oriented X 3, normal strength and tone. Normal symmetric reflexes. Normal coordination and gait  Psychiatric: Normal mood and affect.  Speech is normal and behavior is normal. Judgement and thought content is normal and appropriate.  Cognition and memory are normal.    ASSESSMENT/PLAN:[PN1.1T]  Eder was seen today for physical.    Diagnoses and all orders for this visit:    Routine general medical examination at a health care facility  -     PREVENTIVE VISIT,EST,40-64[PN1.2T]  - Get labs as ordered: CBC, CMP, TSH, UA, LIPID  -[PN1.1M] Encouraged weight reduction through small frequent meals and daily exercise.[PN1.1T]    Adjustment disorder with depressed mood  -[PN1.2T]     refill[PN1.1M] escitalopram (LEXAPRO) 20 MG tablet; Take 1 Tab by mouth daily.    Deformity of right ankle joint  -     XRA ANKLE RIGHT 3 VIEWS; Future[PN1.2T]  - Consider podiatry consult based on results    Follow-up as needed[PN1.1M]    Did patient receive education by provider? Yes  Did patient verbalize  understanding of education? Yes  Education content provided verbally with additional content: handout[PN1.1T]            Revision History        User Key Date/Time User Provider Type Action    > PN1.2 06/15/18 09:44 AM Jama Dozier MD Physician Sign     PN1.1 06/15/18 09:39 AM Jama Dozier MD Physician     C - Copied, M - Manual, T - Template             1 Principal Discharge DX:	Ectopic pregnancy

## 2023-01-11 NOTE — ED STATDOCS - CLINICAL SUMMARY MEDICAL DECISION MAKING FREE TEXT BOX
33 y/o female A3 with PMHx of Anxiety and Depression on Effexor s/p  x2 presents to ED c/o vaginal bleeding. Evaluate for RPOC as well as ectopic, and rule out anemia. Labs, US, UA, and re-assess.

## 2023-01-11 NOTE — ED ADULT TRIAGE NOTE - CHIEF COMPLAINT QUOTE
Pt with LMP 11/18 developed vaginal bleeding 3 days ago. Pt is now going through 2-3 pads per hour. Sent in from OBGYN for possible ectopic.

## 2023-01-11 NOTE — H&P ADULT - HISTORY OF PRESENT ILLNESS
TRACY KATZ is a 32y  who presented to the ED from OBGYN office for vaginal bleeding. The patient states that she had an LMP on  and then took a home pregnancy test on  that was positive. She had received " medication" from a website called: "[employer]" which directed her to take 1 dose of mifepristone on 2023 and then subsequently was directed to take buccal misoprostol on 2023. She reports that starting on 1/3, she had intermittent heavy vaginal bleeding similar to a heavy period. Starting on , she states that she had increased vaginal bleeding requiring 2-3pads per hour that were fully soaked through. She denies cramping, abdominal pain, fever, and chills at home. She currently reports palpitation and dizziness but denies chest pain and shortness of breath. She states that she had an US done at the office today that showed a gestational sac with no fetal heart detected.     OB history: sAB x1 s/p D&C, TOPx2, pCS  failure to descend complicated by PEC. [facility] 2017  GYN history: -fibroids/PCOS HPV + ~10 years ago however reports normal PAP smears since. Denies STIs  Past medical history: anxiety, depression   Past surgical history: D&Cx1 CSx2   Medications: effexor   Allergies: NKDA

## 2023-01-11 NOTE — ED STATDOCS - PHYSICAL EXAMINATION
Const: Awake, alert and oriented. In no acute distress. Well appearing.  HEENT: NC/AT. Moist mucous membranes.  Eyes: No scleral icterus. EOMI.  Neck:. Soft and supple. Full ROM without pain.  Cardiac: Regular rate and regular rhythm. +S1/S2. Peripheral pulses 2+ and symmetric. No LE edema.  Resp: Speaking in full sentences. No evidence of respiratory distress. No wheezes, rales or rhonchi.  Abd: Soft, (+) suprapubic TTP, non-distended. Normal bowel sounds in all 4 quadrants. No guarding or rebound.  Back: Spine midline and non-tender. No CVAT.  Skin: No rashes, abrasions or lacerations.  Lymph: No cervical lymphadenopathy.  Neuro: Awake, alert & oriented x 3. Moves all extremities symmetrically.

## 2023-01-11 NOTE — ED ADULT NURSE NOTE - OBJECTIVE STATEMENT
Assumed care of pt at 1930 in . Pt A&Ox4 c/o possible pregnancy problem after being sent from OBGYN. Pt has heavy bleeding and is soaking 2-3 pads per hour. Resp even and unlabored, abd soft but tender, denies n/v/d.

## 2023-01-11 NOTE — H&P ADULT - ASSESSMENT
A/P: Patient is a 32y  at 5w6d GA by [facility] who is being admitted to the GYN service for management of suspected  scar ectopic.   -Admit to GYN   -Admission labs, COVID  -PE benign; no acute abdomen noted; no active vaginal bleeding on SSE  -Serial abdominal exams   -CBC pending, transfuse PRN   -2 PIVs in place   -Continuous telemetry   -BPs q4 hours   -NPO, IVF   -MFM consult   -Will consider methotrexate IM when labs are completed     Patient with suspected  scar ectopic given TVUS images. Patient stable at this time. Will continue with medical stabilization at this time. If becomes unstable, for OR for possible hysterectomy. GYN ONC on standby.     Discussed with Dr. Hebert.        A/P: Patient is a 32y  at 5w6d GA by [facility] who is being admitted to the GYN service for management of suspected  scar ectopic.     -Admit to GYN   -Admission labs, COVID  -PE benign; no acute abdomen noted; no active vaginal bleeding on SSE  -Serial abdominal exams   -CBC pending, transfuse PRN   -2 PIVs in place   -Continuous telemetry   -BPs q4 hours   -NPO, IVF   -MFM consult   -Will consider methotrexate IM when labs are completed     Patient with suspected  scar ectopic given TVUS images. Patient stable at this time. Will continue with medical stabilization at this time. If becomes unstable, for OR for possible hysterectomy. GYN ONC on standby.     Discussed with Dr. Hebert.     Addendum:    Subjective Hx and Physical Exam reviewed.  Pt seen and examined at bedside with Senior Resident Physician.  I agree with the Resident Physician's assessment and plan of care, as discussed above.  R/B/A of admission for  Ectopic Pregnancy; medical intervention vs possible surgical intervention / possible hysterectomy discussed at length, including medications and options for pain management.  She has no Nondenominational or personal objection to blood transfusion, if necessary.  She was given the opportunity to ask questions and all were addressed.  She understands the plan of care.  MFM and GYN-ONC Consulted secondary to high-risk management / possible surgical intervention.     Pavel Hebert DO, Service Attending       A/P: Patient is a 32y  at 5w6d GA by [facility] who is being admitted to the GYN service for management of suspected  scar ectopic.     -Admit to GYN   -Admission labs, COVID  -PE benign; no acute abdomen noted; no active vaginal bleeding on SSE  -Serial abdominal exams   -CBC pending, transfuse PRN   -2 PIVs in place   -Continuous telemetry   -BPs q4 hours   -NPO, IVF   -MFM consult   -Consider intragestational methotrexate if patient is hemodynamically stable     Patient with suspected  scar ectopic given TVUS images. Patient stable at this time. Will continue with medical stabilization at this time. If becomes unstable, for OR for possible hysterectomy. GYN ONC on standby.     Discussed with Dr. Hebert.     Addendum:    Subjective Hx and Physical Exam reviewed.  Pt seen and examined at bedside with Senior Resident Physician.  I agree with the Resident Physician's assessment and plan of care, as discussed above.  R/B/A of admission for  Ectopic Pregnancy; medical intervention vs possible surgical intervention / possible hysterectomy discussed at length, including medications and options for pain management.  She has no Restorationist or personal objection to blood transfusion, if necessary.  She was given the opportunity to ask questions and all were addressed.  She understands the plan of care.  MFM and GYN-ONC Consulted secondary to high-risk management / possible surgical intervention.     Pavel Hebert DO, Service Attending       A/P: Patient is a 32y  at 5w6d GA by [facility] who is being admitted to the GYN service for management of suspected  scar ectopic.     -Admit to GYN   -Admission labs, COVID  -PE benign; no acute abdomen noted; no active vaginal bleeding on SSE  -Serial abdominal exams   -CBC pending, transfuse PRN   -2 PIVs in place   -Continuous telemetry   -BPs q4 hours   -NPO, IVF   -MFM consult   -Consider intragestational methotrexate if patient is hemodynamically stable   -RH positive     Patient with suspected  scar ectopic given TVUS images. Patient stable at this time. Will continue with medical stabilization at this time. If becomes unstable, for OR for possible hysterectomy. GYN ONC on standby.     Discussed with Dr. Hebert.     Addendum:    Subjective Hx and Physical Exam reviewed.  Pt seen and examined at bedside with Senior Resident Physician.  I agree with the Resident Physician's assessment and plan of care, as discussed above.  R/B/A of admission for  Ectopic Pregnancy; medical intervention vs possible surgical intervention / possible hysterectomy discussed at length, including medications and options for pain management.  She has no Baptist or personal objection to blood transfusion, if necessary.  She was given the opportunity to ask questions and all were addressed.  She understands the plan of care.  MFM and GYN-ONC Consulted secondary to high-risk management / possible surgical intervention.     Pavel Hebert DO, Service Attending

## 2023-01-11 NOTE — CONSULT NOTE ADULT - ASSESSMENT
A/P: Patient is a 32y  at 5w6d GA by [facility] who is being admitted to the GYN service for management of suspected  scar ectopic. MFM consulted for further recommendations.   -Agree with plan as per primary team   -Will perform MFM sono in AM   -Consider methotrexate IM when labs are completed and patient is hemodynamically stable   -If becomes unstable, for OR for possible hysterectomy with GYN team     Discussed with Dr. Garcia and Dr. Ennis.  A/P: Patient is a 32y  at 5w6d GA by [facility] who is being admitted to the GYN service for management of suspected  scar ectopic. MFM consulted for further recommendations.   -Agree with plan as per primary team   -Will perform MFM sono in AM   -Consider intragestational methotrexate if patient is hemodynamically stable   -If becomes unstable, for OR for possible hysterectomy with GYN team     Discussed with Dr. Garcia and Dr. Ennis.

## 2023-01-11 NOTE — CONSULT NOTE ADULT - SUBJECTIVE AND OBJECTIVE BOX
TRACY KATZ is a 32y  who presented to the ED from OBGYN office for vaginal bleeding. The patient states that she had an LMP on  and then took a home pregnancy test on  that was positive. She had received " medication" from a website called: "[employer]" which directed her to take 1 dose of mifepristone on 2023 and then subsequently was directed to take buccal misoprostol on 2023. She reports that starting on 1/3, she had intermittent heavy vaginal bleeding similar to a heavy period. Starting on , she states that she had increased vaginal bleeding requiring 2-3pads per hour that were fully soaked through. She denies cramping, abdominal pain, fever, and chills at home. She currently reports palpitation and dizziness but denies chest pain and shortness of breath. She states that she had an US done at the office today that showed a gestational sac with no fetal heart detected.     OB history: sAB x1 s/p D&C, TOPx2, pCS 2015  failure to descend complicated by PEC. [facility] 2017  GYN history: -fibroids/PCOS HPV + ~10 years ago however reports normal PAP smears since. Denies STIs  Past medical history: anxiety, depression   Past surgical history: D&Cx1 CSx2   Medications: effexor   Allergies: NKDA     T(C): 36.6 (2023 14:55), Max: 36.6 (2023 14:55)  T(F): 97.9 (2023 14:55), Max: 97.9 (2023 14:55)  HR: 147 (2023 14:55) (147 - 147)  BP: 142/86 (2023 14:55) (142/86 - 142/86)  RR: 20 (2023 14:55) (20 - 20)  SpO2: 99% (2023 14:55) (99% - 99%)    Parameters below as of 2023 14:55  Patient On (Oxygen Delivery Method): room air    General: NAD, well-appearing  Heart: RRR  Lungs: CTAB  Abdominal: soft, non-tender, non-distended, no rebound or guarding, +BS  Ext: non-tender, non-edematous   SSE: cervix visualized, closed and without signs of active vaginal bleeding; dark red blood clot noted in posterior fornix    LABS AND IMAGING:    < from: US Transvaginal, OB (23 @ 19:23) >  INTERPRETATION:  CLINICAL INFORMATION: Threatened . History of       LMP: 2022    Estimated Gestational Age by LMP: 7 weeks 5 days    COMPARISON: None available.    Endovaginal and transabdominal pelvic sonogram. Color and Spectral   Doppler was performed.    FINDINGS:  There is a gestational sac containing a yolk sac and a fetal pole in the   lower uterine segment toward the right at the level of the  scar   suspicious for ectopic pregnancy. No definite cardiac activity is seen.   Gestational age of 5 weeks 6 days by crown-rump length.      Right ovary: 2.2 cm x 1.5 cm x 2.3 cm. Within normal limits. Normal   arterial and venous waveforms.  Left ovary: 2.5 cm x 1.4 cm x 2.2 cm. Within normal limits. Normal   arterial and venous waveforms.    Fluid: None.    IMPRESSION:  Early pregnancy appears in the lower uterine segment to the right   suspicious for a  ectopic pregnancy. Gestational age by   crown-rump length is 5 weeks 6 days. No fetal heart motion is identified.   Line results were discussed with the OB resident Daniel Montejo at   7:45 PM on 2023    < end of copied text >

## 2023-01-11 NOTE — H&P ADULT - NSHPLABSRESULTS_GEN_ALL_CORE
< from: US Transvaginal, OB (23 @ 19:23) >    INTERPRETATION:  CLINICAL INFORMATION: Threatened . History of       LMP: 2022    Estimated Gestational Age by LMP: 7 weeks 5 days    COMPARISON: None available.    Endovaginal and transabdominal pelvic sonogram. Color and Spectral   Doppler was performed.    FINDINGS:  There is a gestational sac containing a yolk sac and a fetal pole in the   lower uterine segment toward the right at the level of the  scar   suspicious for ectopic pregnancy. No definite cardiac activity is seen.   Gestational age of 5 weeks 6 days by crown-rump length.      Right ovary: 2.2 cm x 1.5 cm x 2.3 cm. Within normal limits. Normal   arterial and venous waveforms.  Left ovary: 2.5 cm x 1.4 cm x 2.2 cm. Within normal limits. Normal   arterial and venous waveforms.    Fluid: None.    IMPRESSION:  Early pregnancy appears in the lower uterine segment to the right   suspicious for a  ectopic pregnancy. Gestational age by   crown-rump length is 5 weeks 6 days. No fetal heart motion is identified.   Line results were discussed with the OB resident Daniel Montejo at   7:45 PM on 2023

## 2023-01-11 NOTE — ED STATDOCS - OBJECTIVE STATEMENT
31 y/o female A3 with PMHx of Anxiety and Depression on Effexor s/p  x2 presents to ED c/o vaginal bleeding. Patient reports she took the  pill (purchased online) initially on 23 and the next dose on 23. States she developed heavy vaginal bleeding 4 days ago, now today with clots and persistent bleeding. Reports heart palpitations and generalized malaise. Patient saw her OBGYN today who did an US that showed a fetus but no heart beat.   Pt denies fevers/chills, ha, loc, focal neuro deficits, cp/sob/palp, cough, abd pain/n/v/d, urinary symptoms, recent travel and sick contacts. Hx of blood transfusions 31 y/o female A3 with PMHx of Anxiety and Depression on Effexor s/p  x2 presents to ED c/o vaginal bleeding. Patient reports she took the  pill (purchased online) initially on 23 and the next dose on 23. States she developed heavy vaginal bleeding 4 days ago, now today with clots and persistent bleeding. Reports heart palpitations, lower abd cramping, and generalized malaise. Patient saw her OBGYN today who did an US that showed a fetus but no heart beat.   Pt denies fevers/chills, ha, loc, focal neuro deficits, cp/sob/palp, cough, n/v/d, urinary symptoms, recent travel and sick contacts. Hx of blood transfusions

## 2023-01-11 NOTE — CONSULT NOTE ADULT - ASSESSMENT
TRACY KATZ is a 32y  assessed for vaginal bleeding likely secondary to a incomplete .    - Patient had active bleeding noted, labs pending. F/u hemoglobin to assess need for blood products  - Pending transvaginal US results  - Team aware and will make complete assessment pending labs and TVUS results    Discussed with Dr. Hebert  TRACY KATZ is a 32y  assessed for vaginal bleeding likely secondary to a incomplete .    - Patient had active bleeding noted, labs pending. F/u hemoglobin to assess need for blood products  - Pending transvaginal US results  - Team aware and will make complete assessment pending labs and TVUS results    Discussed with Dr. Hebert     Addendum:    See H&P for details. I agree with the Resident Physician's assessment and plan of care, as discussed above.  R/B/A of admission for management of ectopic pregnancy, possible medical vs surgical discussed at length, including medications and options for pain management.  She has no Anabaptist or personal objection to blood transfusion, if necessary.  She was given the opportunity to ask questions and all were addressed.  She understands the plan of care.    Pavel Hebert, DO

## 2023-01-12 ENCOUNTER — TRANSCRIPTION ENCOUNTER (OUTPATIENT)
Age: 33
End: 2023-01-12

## 2023-01-12 ENCOUNTER — RESULT REVIEW (OUTPATIENT)
Age: 33
End: 2023-01-12

## 2023-01-12 LAB
APTT BLD: 26.7 SEC — LOW (ref 27.5–35.5)
FIBRINOGEN PPP-MCNC: 427 MG/DL — SIGNIFICANT CHANGE UP (ref 330–520)
HCT VFR BLD CALC: 23.8 % — LOW (ref 34.5–45)
HGB BLD-MCNC: 7.8 G/DL — LOW (ref 11.5–15.5)
INR BLD: 1.16 RATIO — SIGNIFICANT CHANGE UP (ref 0.88–1.16)
MCHC RBC-ENTMCNC: 32.2 PG — SIGNIFICANT CHANGE UP (ref 27–34)
MCHC RBC-ENTMCNC: 32.8 GM/DL — SIGNIFICANT CHANGE UP (ref 32–36)
MCV RBC AUTO: 98.3 FL — SIGNIFICANT CHANGE UP (ref 80–100)
PLATELET # BLD AUTO: 179 K/UL — SIGNIFICANT CHANGE UP (ref 150–400)
PROTHROM AB SERPL-ACNC: 13.5 SEC — HIGH (ref 10.5–13.4)
RBC # BLD: 2.42 M/UL — LOW (ref 3.8–5.2)
RBC # FLD: 13.5 % — SIGNIFICANT CHANGE UP (ref 10.3–14.5)
SARS-COV-2 RNA SPEC QL NAA+PROBE: DETECTED
WBC # BLD: 7.22 K/UL — SIGNIFICANT CHANGE UP (ref 3.8–10.5)
WBC # FLD AUTO: 7.22 K/UL — SIGNIFICANT CHANGE UP (ref 3.8–10.5)

## 2023-01-12 PROCEDURE — 76817 TRANSVAGINAL US OBSTETRIC: CPT | Mod: 26

## 2023-01-12 PROCEDURE — 58571 TLH W/T/O 250 G OR LESS: CPT | Mod: 22

## 2023-01-12 PROCEDURE — 88307 TISSUE EXAM BY PATHOLOGIST: CPT | Mod: 26

## 2023-01-12 RX ORDER — CEFAZOLIN SODIUM 1 G
2000 VIAL (EA) INJECTION ONCE
Refills: 0 | Status: DISCONTINUED | OUTPATIENT
Start: 2023-01-12 | End: 2023-01-12

## 2023-01-12 RX ORDER — SODIUM CHLORIDE 9 MG/ML
1000 INJECTION, SOLUTION INTRAVENOUS
Refills: 0 | Status: DISCONTINUED | OUTPATIENT
Start: 2023-01-12 | End: 2023-01-13

## 2023-01-12 RX ORDER — VENLAFAXINE HCL 75 MG
75 CAPSULE, EXT RELEASE 24 HR ORAL DAILY
Refills: 0 | Status: DISCONTINUED | OUTPATIENT
Start: 2023-01-12 | End: 2023-01-13

## 2023-01-12 RX ORDER — IBUPROFEN 200 MG
600 TABLET ORAL EVERY 6 HOURS
Refills: 0 | Status: DISCONTINUED | OUTPATIENT
Start: 2023-01-12 | End: 2023-01-13

## 2023-01-12 RX ORDER — IBUPROFEN 200 MG
1 TABLET ORAL
Qty: 28 | Refills: 0
Start: 2023-01-12 | End: 2023-01-18

## 2023-01-12 RX ORDER — INFLUENZA VIRUS VACCINE 15; 15; 15; 15 UG/.5ML; UG/.5ML; UG/.5ML; UG/.5ML
0.5 SUSPENSION INTRAMUSCULAR ONCE
Refills: 0 | Status: DISCONTINUED | OUTPATIENT
Start: 2023-01-12 | End: 2023-01-13

## 2023-01-12 RX ORDER — KETOROLAC TROMETHAMINE 30 MG/ML
30 SYRINGE (ML) INJECTION EVERY 6 HOURS
Refills: 0 | Status: COMPLETED | OUTPATIENT
Start: 2023-01-12 | End: 2023-01-13

## 2023-01-12 RX ORDER — ONDANSETRON 8 MG/1
4 TABLET, FILM COATED ORAL ONCE
Refills: 0 | Status: DISCONTINUED | OUTPATIENT
Start: 2023-01-12 | End: 2023-01-13

## 2023-01-12 RX ORDER — ALPRAZOLAM 0.25 MG
0.5 TABLET ORAL ONCE
Refills: 0 | Status: DISCONTINUED | OUTPATIENT
Start: 2023-01-12 | End: 2023-01-12

## 2023-01-12 RX ORDER — CEFAZOLIN SODIUM 1 G
2000 VIAL (EA) INJECTION ONCE
Refills: 0 | Status: COMPLETED | OUTPATIENT
Start: 2023-01-12 | End: 2023-01-12

## 2023-01-12 RX ORDER — OXYCODONE HYDROCHLORIDE 5 MG/1
5 TABLET ORAL ONCE
Refills: 0 | Status: DISCONTINUED | OUTPATIENT
Start: 2023-01-12 | End: 2023-01-13

## 2023-01-12 RX ORDER — HEPARIN SODIUM 5000 [USP'U]/ML
5000 INJECTION INTRAVENOUS; SUBCUTANEOUS EVERY 12 HOURS
Refills: 0 | Status: DISCONTINUED | OUTPATIENT
Start: 2023-01-13 | End: 2023-01-13

## 2023-01-12 RX ORDER — ACETAMINOPHEN 500 MG
975 TABLET ORAL
Refills: 0 | Status: DISCONTINUED | OUTPATIENT
Start: 2023-01-12 | End: 2023-01-13

## 2023-01-12 RX ORDER — FENTANYL CITRATE 50 UG/ML
50 INJECTION INTRAVENOUS
Refills: 0 | Status: DISCONTINUED | OUTPATIENT
Start: 2023-01-12 | End: 2023-01-13

## 2023-01-12 RX ORDER — METRONIDAZOLE 500 MG
500 TABLET ORAL ONCE
Refills: 0 | Status: COMPLETED | OUTPATIENT
Start: 2023-01-12 | End: 2023-01-12

## 2023-01-12 RX ORDER — VENLAFAXINE HCL 75 MG
1 CAPSULE, EXT RELEASE 24 HR ORAL
Qty: 0 | Refills: 0 | DISCHARGE
Start: 2023-01-12

## 2023-01-12 RX ORDER — OXYCODONE HYDROCHLORIDE 5 MG/1
5 TABLET ORAL
Refills: 0 | Status: COMPLETED | OUTPATIENT
Start: 2023-01-12 | End: 2023-01-19

## 2023-01-12 RX ORDER — ACETAMINOPHEN 500 MG
2 TABLET ORAL
Qty: 56 | Refills: 0
Start: 2023-01-12 | End: 2023-01-18

## 2023-01-12 RX ADMIN — SODIUM CHLORIDE 75 MILLILITER(S): 9 INJECTION, SOLUTION INTRAVENOUS at 23:10

## 2023-01-12 RX ADMIN — FENTANYL CITRATE 50 MICROGRAM(S): 50 INJECTION INTRAVENOUS at 23:40

## 2023-01-12 RX ADMIN — Medication 0.5 MILLIGRAM(S): at 04:28

## 2023-01-12 RX ADMIN — FENTANYL CITRATE 50 MICROGRAM(S): 50 INJECTION INTRAVENOUS at 23:30

## 2023-01-12 RX ADMIN — Medication 400 MILLIGRAM(S): at 23:30

## 2023-01-12 RX ADMIN — Medication 100 MILLIGRAM(S): at 17:07

## 2023-01-12 RX ADMIN — Medication 1000 MILLIGRAM(S): at 23:45

## 2023-01-12 RX ADMIN — Medication 2000 MILLIGRAM(S): at 19:00

## 2023-01-12 NOTE — BRIEF OPERATIVE NOTE - OPERATION/FINDINGS
uterus, anteverted, approx 8wk size   scar ectopic noted  otherwise grossly normal fallopian tubes and ovaries  Nothing seen in general survey of abdomen such as masses, lesions, or bleeding in RLQ, RUQ, LLQ, LUQ, colic gutters, omentum, pelvis, or under trocar placement sites  Vaginal cuff: intact  Hemostasis: achieved  cystoscopy: bilateral ureteral jet efflux noted uterus, anteverted, approx 10wk size   scar ectopic noted, 3cm in size bulging at right side of scar  Adhesions of bladder to lower uterine segment  otherwise grossly normal fallopian tubes and ovaries  Nothing seen in general survey of abdomen such as masses, lesions, or bleeding in RLQ, RUQ, LLQ, LUQ, colic gutters, omentum, pelvis, or under trocar placement sites  Vaginal cuff: intact  Hemostasis: achieved  cystoscopy: bilateral ureteral jet efflux noted

## 2023-01-12 NOTE — CHART NOTE - NSCHARTNOTEFT_GEN_A_CORE
Patient consented for 2u pRBCs in the setting of Hgb 7.8.    Asymptomatic.   Patient re-evaluated at this time. Continue to have a benign PE and hemodynamically stable.   Continue current management.     Vital Signs Last 24 Hrs  T(C): 37.1 (12 Jan 2023 04:41), Max: 37.2 (12 Jan 2023 03:29)  T(F): 98.7 (12 Jan 2023 04:41), Max: 98.9 (12 Jan 2023 03:29)  HR: 89 (12 Jan 2023 04:41) (89 - 147)  BP: 117/71 (12 Jan 2023 04:41) (112/65 - 142/86)  BP(mean): --  RR: 17 (12 Jan 2023 04:41) (17 - 20)  SpO2: 97% (12 Jan 2023 04:41) (97% - 100%)    Parameters below as of 12 Jan 2023 04:41  Patient On (Oxygen Delivery Method): room air

## 2023-01-12 NOTE — BRIEF OPERATIVE NOTE - NSICDXBRIEFPROCEDURE_GEN_ALL_CORE_FT
PROCEDURES:  Laparoscopic total hysterectomy with bilateral salpingectomy and cystoscopy 2023 23:07:57  Tigre Lopez  Excision, ectopic pregnancy, laparoscopic 2023 23:10:06  scar ectopic Tigre Lopez

## 2023-01-12 NOTE — PATIENT PROFILE ADULT - DO YOU FEEL LIKE HURTING YOURSELF OR OTHERS?
Gauri Bright MD
no

## 2023-01-12 NOTE — DISCHARGE NOTE PROVIDER - HOSPITAL COURSE
Patient was assessed in the Ed for heavy vaginal bleeding for 5 days s/p mifepristone and misoprostol from online distributor. Patient was subsequently found to have a  scar ectopic pregnancy s/p laparoscopic total abdominal hysterectomy and bilateral salpingectomy. Postpartum pain is well controlled with PRN medication. She has no difficulty with ambulation, voiding or PO intake. Lab values and vital signs are within normal limits prior to discharge. Patient was assessed in the Ed for heavy vaginal bleeding for 5 days s/p mifepristone and misoprostol from online distributor. Patient was subsequently found to have a  scar ectopic pregnancy s/p laparoscopic total abdominal hysterectomy, bilateral salpingectomy, and cystoscopy. Postpartum pain is well controlled with PRN medication. She has no difficulty with ambulation, voiding or PO intake. Lab values and vital signs are within normal limits prior to discharge.

## 2023-01-12 NOTE — PROGRESS NOTE ADULT - ASSESSMENT
A/P: TRACY KATZ is a 32y  @ 5w6d by [facility] with suspicion for CS scar ectopic by TVUS admitted to GYN for inpatient management.    - Hb 9.8 > 7.6, 2u pRBCs running, vitals stable, no active bleeding, abd benign. NO emergent interventions necessary at this time.  - MFM consulted. For official MFM sono this morning to confirm CS scar ectopic. For possible intrauterine MTX.  - GYn onc aware if patient becomes stable, for OR for hyst.  - Dr. Schneider aware if resection decided upon.  - C/w symptomatic management until US completed.     A/P: TRACY KATZ is a 32y  @ 5w6d by [facility] with suspicion for CS scar ectopic by TVUS admitted to GYN for inpatient management.    - Hb 9.8 > 7.6, 2u pRBCs running, vitals stable, no active bleeding, abd benign. No emergent interventions necessary at this time.  - MFM consulted. For official MFM sono this morning to confirm CS scar ectopic. For possible intrauterine MTX vs OR for resection   - Dr. Schneider aware if resection decided upon  - Gyn onc aware if patient becomes unstable, for OR for hyst.  - C/w symptomatic management until MFM US completed  - Cov+ , asymptoamtic  - RH+ , no rhogam indicated

## 2023-01-12 NOTE — DISCHARGE NOTE PROVIDER - NSDCMRMEDTOKEN_GEN_ALL_CORE_FT
acetaminophen 500 mg oral tablet: 2 tab(s) orally every 6 hours   ibuprofen 600 mg oral tablet: 1 tab(s) orally every 6 hours   venlafaxine 75 mg oral tablet: 1 tab(s) orally once a day   acetaminophen 500 mg oral tablet: 2 tab(s) orally every 6 hours  ibuprofen 200 mg oral tablet: 3 tab(s) orally every 6 hours  oxyCODONE 5 mg oral tablet: 1 tab(s) orally every 4 to 6 hours MDD:4 tabs  venlafaxine 75 mg oral tablet: 1 tab(s) orally once a day

## 2023-01-12 NOTE — CHART NOTE - NSCHARTNOTEFT_GEN_A_CORE
Patient seen and examined by me. M saw patient today and performed ultrasound and felt that in context of patient's bleeding an position of scar ectopic MTX would not be a good management option for her. I was consulted to offer laparoscopic management of her  scar ectopic pregnancy. I reviewed patient's history and her images and agree that laparoscopic management of the pregnancy is appropriate as pregnancy appears to be bulging up towards the uterine serosa.    Counseled patient that options for management include laparoscopic excision of the scar pregnancy and  defect repair or laparoscopic hysterectomy. I counseled patient about the risks and benefits of both options, namely that the excision preserves fertility (though there is still a minority chance of scare pregnancy recurrence) but has a higher risk of bleeding and, while still rare, need for exploratory laparotomy in event of uncontrolled bleeding, while the hysterectomy carries a lower risk of bleeding and complications, but would completely preclude becoming pregnant in the future. Patient was previously advised of these options by the [facility] and resident team and has had most of the day to consider which she preferred as well as discuss the choices with her family. Patient reports she is absolutely certain she does not want any more children, does not want to risk a  scar pregnancy occurring again, and would thus rather have the hysterectomy as the safer procedure. She similarly reports having very heavy menses and would be glad to be relieved of this as well.    Patient thus consented for total laparoscopic hysterectomy, bilateral salpingectomy, cystoscopy. Risks including bleeding, infection, and damage to surrounding organs were discussed. Pt expressed understanding and consents were signed.    Immanuel Schneider MD

## 2023-01-12 NOTE — PROGRESS NOTE ADULT - SUBJECTIVE AND OBJECTIVE BOX
TRACY KATZ is a 32y  @ 5w6d by [facility] who presented to the ED after being sent from her GYN Dr. Chester's office for heavy vaginal bleeding post mifepristone and misoprostol from . ED TVUS suspicious for CS scar ectopic, admitted to GYN for  TRACY KATZ is a 32y  @ 5w6d by [facility] with suspicion for CS scar ectopic by TVUS admitted to GYN for inpatient management.    Patient was seen and examined at bedside. No acute events O/N.  Currently receiving 2u pRBCs.  Pain is controlled with PRN medication.  Denies URI symptoms.    T(C): 37.1 (23 @ 04:41), Max: 37.2 (23 @ 03:29)  HR: 89 (23 @ 04:41) (89 - 147)  BP: 117/71 (23 @ 04:41) (112/65 - 142/86)  RR: 17 (23 @ 04:41) (17 - 20)  SpO2: 97% (23 @ 04:41) (97% - 100%)    Gen: NAD, AOx3  Abdomen:   VE:   Extrem: no calf tenderness or edema     Labs:                         7.8    7.22  )-----------( 179      ( 2023 02:35 )             23.8         138  |  103  |  10.6  ----------------------------<  121<H>  4.1   |  21.0<L>  |  0.53    Ca    8.6      2023 20:00    TPro  6.7  /  Alb  4.1  /  TBili  0.4  /  DBili  x   /  AST  20  /  ALT  16  /  AlkPhos  75        RH+    COV+     TRACY KATZ is a 32y  @ 5w6d by [facility] with suspicion for CS scar ectopic by TVUS admitted to GYN for inpatient management.    Patient was seen and examined at bedside. No acute events O/N.  Currently receiving 2u pRBCs in ED.  Pain is controlled with PRN medication.  Denies URI symptoms.    T(C): 37.1 (23 @ 04:41), Max: 37.2 (23 @ 03:29)  HR: 89 (23 @ 04:41) (89 - 147)  BP: 117/71 (23 @ 04:41) (112/65 - 142/86)  RR: 17 (23 @ 04:41) (17 - 20)  SpO2: 97% (23 @ 04:41) (97% - 100%)    Gen: NAD, AOx3  Abdomen: soft, non tender, non distended  VE: deferred, performed O/N.  Extrem: no calf tenderness or edema     Labs:                         7.8    7.22  )-----------( 179      ( 2023 02:35 )             23.8     -    138  |  103  |  10.6  ----------------------------<  121<H>  4.1   |  21.0<L>  |  0.53    Ca    8.6      2023 20:00    TPro  6.7  /  Alb  4.1  /  TBili  0.4  /  DBili  x   /  AST  20  /  ALT  16  /  AlkPhos  75  -11      RH+    COV+     TRACY KATZ is a 32y  @ 5w6d by [facility] with suspicion for CS scar ectopic by TVUS admitted to GYN for inpatient management.    Patient was seen and examined at bedside. No acute events O/N.  Currently receiving 2u pRBCs in ED.  Pain is controlled with PRN medication.  Denies heavy vaginal bleeding.  Denies URI symptoms.    T(C): 37.1 (23 @ 04:41), Max: 37.2 (23 @ 03:29)  HR: 89 (23 @ 04:41) (89 - 147)  BP: 117/71 (23 @ 04:41) (112/65 - 142/86)  RR: 17 (23 @ 04:41) (17 - 20)  SpO2: 97% (23 @ 04:41) (97% - 100%)    Gen: NAD, AOx3  Abdomen: soft, non tender, non distended  VE: deferred, performed O/N.  Extrem: no calf tenderness or edema     Labs:                         7.8    7.22  )-----------( 179      ( 2023 02:35 )             23.8     -    138  |  103  |  10.6  ----------------------------<  121<H>  4.1   |  21.0<L>  |  0.53    Ca    8.6      2023 20:00    TPro  6.7  /  Alb  4.1  /  TBili  0.4  /  DBili  x   /  AST  20  /  ALT  16  /  AlkPhos  75  -      RH+    COV+

## 2023-01-12 NOTE — DISCHARGE NOTE PROVIDER - CARE PROVIDER_API CALL
Immanuel Schneider)  OBSGYN  [facility]  [id number]  [place] [address]  Phone: [phone]  Fax: [phone]  Follow Up Time: 2 weeks

## 2023-01-12 NOTE — PATIENT PROFILE ADULT - NSTRANSFERBELONGINGSRESP_GEN_A_NUR
"Occupational Therapy Evaluation completed.   Functional Status:  CGA to SBA for functional transfers with FWW and grab bars   Plan of Care: Patient with no further skilled OT needs in the acute care setting at this time  Discharge Recommendations:  Equipment: Front-Wheel Walker. Post-acute therapy Currently anticipate no further skilled therapy needs once patient is discharged from the inpatient setting.    See \"Rehab Therapy-Acute\" Patient Summary Report for complete documentation.    " yes

## 2023-01-13 ENCOUNTER — TRANSCRIPTION ENCOUNTER (OUTPATIENT)
Age: 33
End: 2023-01-13

## 2023-01-13 VITALS
OXYGEN SATURATION: 99 % | TEMPERATURE: 98 F | HEART RATE: 96 BPM | SYSTOLIC BLOOD PRESSURE: 129 MMHG | DIASTOLIC BLOOD PRESSURE: 80 MMHG | RESPIRATION RATE: 18 BRPM

## 2023-01-13 LAB
BASOPHILS # BLD AUTO: 0.02 K/UL — SIGNIFICANT CHANGE UP (ref 0–0.2)
BASOPHILS NFR BLD AUTO: 0.2 % — SIGNIFICANT CHANGE UP (ref 0–2)
EOSINOPHIL # BLD AUTO: 0 K/UL — SIGNIFICANT CHANGE UP (ref 0–0.5)
EOSINOPHIL NFR BLD AUTO: 0 % — SIGNIFICANT CHANGE UP (ref 0–6)
HCT VFR BLD CALC: 25.8 % — LOW (ref 34.5–45)
HGB BLD-MCNC: 8.5 G/DL — LOW (ref 11.5–15.5)
IMM GRANULOCYTES NFR BLD AUTO: 1.3 % — HIGH (ref 0–0.9)
LYMPHOCYTES # BLD AUTO: 0.92 K/UL — LOW (ref 1–3.3)
LYMPHOCYTES # BLD AUTO: 9.6 % — LOW (ref 13–44)
MCHC RBC-ENTMCNC: 32.1 PG — SIGNIFICANT CHANGE UP (ref 27–34)
MCHC RBC-ENTMCNC: 32.9 GM/DL — SIGNIFICANT CHANGE UP (ref 32–36)
MCV RBC AUTO: 97.4 FL — SIGNIFICANT CHANGE UP (ref 80–100)
MONOCYTES # BLD AUTO: 0.31 K/UL — SIGNIFICANT CHANGE UP (ref 0–0.9)
MONOCYTES NFR BLD AUTO: 3.2 % — SIGNIFICANT CHANGE UP (ref 2–14)
NEUTROPHILS # BLD AUTO: 8.19 K/UL — HIGH (ref 1.8–7.4)
NEUTROPHILS NFR BLD AUTO: 85.7 % — HIGH (ref 43–77)
PLATELET # BLD AUTO: 181 K/UL — SIGNIFICANT CHANGE UP (ref 150–400)
RBC # BLD: 2.65 M/UL — LOW (ref 3.8–5.2)
RBC # FLD: 15.1 % — HIGH (ref 10.3–14.5)
WBC # BLD: 9.56 K/UL — SIGNIFICANT CHANGE UP (ref 3.8–10.5)
WBC # FLD AUTO: 9.56 K/UL — SIGNIFICANT CHANGE UP (ref 3.8–10.5)

## 2023-01-13 PROCEDURE — 80053 COMPREHEN METABOLIC PANEL: CPT

## 2023-01-13 PROCEDURE — 85027 COMPLETE CBC AUTOMATED: CPT

## 2023-01-13 PROCEDURE — 76801 OB US < 14 WKS SINGLE FETUS: CPT

## 2023-01-13 PROCEDURE — 86901 BLOOD TYPING SEROLOGIC RH(D): CPT

## 2023-01-13 PROCEDURE — 85025 COMPLETE CBC W/AUTO DIFF WBC: CPT

## 2023-01-13 PROCEDURE — 76817 TRANSVAGINAL US OBSTETRIC: CPT

## 2023-01-13 PROCEDURE — 86850 RBC ANTIBODY SCREEN: CPT

## 2023-01-13 PROCEDURE — 99285 EMERGENCY DEPT VISIT HI MDM: CPT

## 2023-01-13 PROCEDURE — 85610 PROTHROMBIN TIME: CPT

## 2023-01-13 PROCEDURE — 93005 ELECTROCARDIOGRAM TRACING: CPT

## 2023-01-13 PROCEDURE — 84436 ASSAY OF TOTAL THYROXINE: CPT

## 2023-01-13 PROCEDURE — 36430 TRANSFUSION BLD/BLD COMPNT: CPT

## 2023-01-13 PROCEDURE — 85384 FIBRINOGEN ACTIVITY: CPT

## 2023-01-13 PROCEDURE — 86900 BLOOD TYPING SEROLOGIC ABO: CPT

## 2023-01-13 PROCEDURE — 84702 CHORIONIC GONADOTROPIN TEST: CPT

## 2023-01-13 PROCEDURE — U0005: CPT

## 2023-01-13 PROCEDURE — 88307 TISSUE EXAM BY PATHOLOGIST: CPT

## 2023-01-13 PROCEDURE — 86923 COMPATIBILITY TEST ELECTRIC: CPT

## 2023-01-13 PROCEDURE — U0003: CPT

## 2023-01-13 PROCEDURE — 36415 COLL VENOUS BLD VENIPUNCTURE: CPT

## 2023-01-13 PROCEDURE — P9016: CPT

## 2023-01-13 PROCEDURE — 84443 ASSAY THYROID STIM HORMONE: CPT

## 2023-01-13 PROCEDURE — 85730 THROMBOPLASTIN TIME PARTIAL: CPT

## 2023-01-13 RX ORDER — ACETAMINOPHEN 500 MG
3 TABLET ORAL
Qty: 48 | Refills: 0
Start: 2023-01-13 | End: 2023-01-16

## 2023-01-13 RX ORDER — ACETAMINOPHEN 500 MG
1000 TABLET ORAL ONCE
Refills: 0 | Status: COMPLETED | OUTPATIENT
Start: 2023-01-13 | End: 2023-01-12

## 2023-01-13 RX ORDER — OXYCODONE HYDROCHLORIDE 5 MG/1
5 TABLET ORAL
Refills: 0 | Status: DISCONTINUED | OUTPATIENT
Start: 2023-01-13 | End: 2023-01-13

## 2023-01-13 RX ORDER — IBUPROFEN 200 MG
1 TABLET ORAL
Qty: 28 | Refills: 0
Start: 2023-01-13 | End: 2023-01-19

## 2023-01-13 RX ORDER — OXYCODONE HYDROCHLORIDE 5 MG/1
1 TABLET ORAL
Qty: 10 | Refills: 0
Start: 2023-01-13

## 2023-01-13 RX ORDER — OXYCODONE HYDROCHLORIDE 5 MG/1
1 TABLET ORAL
Qty: 8 | Refills: 0
Start: 2023-01-13 | End: 2023-01-14

## 2023-01-13 RX ORDER — OXYCODONE HYDROCHLORIDE 5 MG/1
5 TABLET ORAL EVERY 4 HOURS
Refills: 0 | Status: DISCONTINUED | OUTPATIENT
Start: 2023-01-13 | End: 2023-01-13

## 2023-01-13 RX ORDER — IBUPROFEN 200 MG
3 TABLET ORAL
Qty: 0 | Refills: 0 | DISCHARGE

## 2023-01-13 RX ORDER — OXYCODONE HYDROCHLORIDE 5 MG/1
10 TABLET ORAL EVERY 4 HOURS
Refills: 0 | Status: DISCONTINUED | OUTPATIENT
Start: 2023-01-13 | End: 2023-01-13

## 2023-01-13 RX ORDER — OXYCODONE HYDROCHLORIDE 5 MG/1
5 TABLET ORAL ONCE
Refills: 0 | Status: DISCONTINUED | OUTPATIENT
Start: 2023-01-13 | End: 2023-01-13

## 2023-01-13 RX ORDER — SIMETHICONE 80 MG/1
80 TABLET, CHEWABLE ORAL EVERY 4 HOURS
Refills: 0 | Status: DISCONTINUED | OUTPATIENT
Start: 2023-01-13 | End: 2023-01-13

## 2023-01-13 RX ORDER — ACETAMINOPHEN 500 MG
2 TABLET ORAL
Qty: 0 | Refills: 0 | DISCHARGE

## 2023-01-13 RX ADMIN — HEPARIN SODIUM 5000 UNIT(S): 5000 INJECTION INTRAVENOUS; SUBCUTANEOUS at 09:31

## 2023-01-13 RX ADMIN — Medication 30 MILLIGRAM(S): at 12:26

## 2023-01-13 RX ADMIN — OXYCODONE HYDROCHLORIDE 5 MILLIGRAM(S): 5 TABLET ORAL at 07:15

## 2023-01-13 RX ADMIN — Medication 975 MILLIGRAM(S): at 15:59

## 2023-01-13 RX ADMIN — SIMETHICONE 80 MILLIGRAM(S): 80 TABLET, CHEWABLE ORAL at 09:31

## 2023-01-13 RX ADMIN — Medication 30 MILLIGRAM(S): at 05:27

## 2023-01-13 RX ADMIN — Medication 975 MILLIGRAM(S): at 09:32

## 2023-01-13 RX ADMIN — Medication 975 MILLIGRAM(S): at 02:10

## 2023-01-13 RX ADMIN — SIMETHICONE 80 MILLIGRAM(S): 80 TABLET, CHEWABLE ORAL at 13:04

## 2023-01-13 RX ADMIN — OXYCODONE HYDROCHLORIDE 5 MILLIGRAM(S): 5 TABLET ORAL at 05:26

## 2023-01-13 RX ADMIN — Medication 30 MILLIGRAM(S): at 12:56

## 2023-01-13 RX ADMIN — Medication 975 MILLIGRAM(S): at 10:02

## 2023-01-13 RX ADMIN — OXYCODONE HYDROCHLORIDE 5 MILLIGRAM(S): 5 TABLET ORAL at 03:11

## 2023-01-13 RX ADMIN — Medication 975 MILLIGRAM(S): at 16:29

## 2023-01-13 RX ADMIN — OXYCODONE HYDROCHLORIDE 5 MILLIGRAM(S): 5 TABLET ORAL at 02:11

## 2023-01-13 RX ADMIN — SODIUM CHLORIDE 125 MILLILITER(S): 9 INJECTION, SOLUTION INTRAVENOUS at 02:11

## 2023-01-13 RX ADMIN — Medication 975 MILLIGRAM(S): at 02:49

## 2023-01-13 NOTE — CHART NOTE - NSCHARTNOTEFT_GEN_A_CORE
Patient seen at bedside. Reports improved pain from this AM.  Tolerating PO, denies N/V.   Ambulating without difficulty.  - flatus/-BM/+ voiding    ICU Vital Signs Last 24 Hrs  T(C): 37.5 (13 Jan 2023 08:11), Max: 37.5 (13 Jan 2023 08:11)  T(F): 99.5 (13 Jan 2023 08:11), Max: 99.5 (13 Jan 2023 08:11)  HR: 82 (13 Jan 2023 08:11) (82 - 100)  BP: 136/80 (13 Jan 2023 08:11) (121/49 - 140/71)  BP(mean): 88 (13 Jan 2023 01:10) (68 - 91)  RR: 18 (13 Jan 2023 08:11) (11 - 20)  SpO2: 98% (13 Jan 2023 08:11) (98% - 100%)      A/P: TRACY KATZ is a 31yo now POD#1 s/p TLH, BS, cysto for CS scar ectopic pregnancy.    Patient stable and doing well postoperatively. For discharge home with instructions to f/u with Dr. Schneider outpatient in 2 weeks, or sooner if heavy bleeding, uncontrolled pain despite medications, anemia symptoms, fevers > 100.4F, or for any other concerns.

## 2023-01-13 NOTE — PROGRESS NOTE ADULT - SUBJECTIVE AND OBJECTIVE BOX
TRACY KATZ is a 33yo now POD#0 s/p TLH, BS, cysto    Patient was seen and examined at bedside. No acute events O/N.  Pain is controlled with PRN medication.  Tolerating PO diet, denies N/V.   Not yet ambulating  + flatus/-BM/+ voiding    T(C): 36.8 (01-13-23 @ 01:10), Max: 37.2 (01-12-23 @ 09:00)  HR: 88 (01-13-23 @ 01:10) (87 - 100)  BP: 134/72 (01-13-23 @ 01:10) (114/67 - 140/71)  RR: 11 (01-13-23 @ 01:10) (11 - 20)  SpO2: 99% (01-13-23 @ 01:10) (98% - 100%)    Gen: NAD, AOx3  Abdomen: soft, nondistended, appropriately tender; clean dry and intact incisions  VE: vaginal spotting noted  : romero in place, draining clear yellow urine  Extrem: no calf tenderness or edema     Labs:                         7.8    7.22  )-----------( 179      ( 12 Jan 2023 02:35 )             23.8     01-11    138  |  103  |  10.6  ----------------------------<  121<H>  4.1   |  21.0<L>  |  0.53    Ca    8.6      11 Jan 2023 20:00    TPro  6.7  /  Alb  4.1  /  TBili  0.4  /  DBili  x   /  AST  20  /  ALT  16  /  AlkPhos  75  01-11            TRACY KATZ is a 31yo now POD#1 s/p TLH, BS, cysto    Patient was seen and examined at bedside. No acute events O/N.  Pain is controlled with PRN medication.  Tolerating PO diet, denies N/V.   Not yet ambulating  + flatus/-BM/+ voiding    T(C): 36.8 (01-13-23 @ 01:10), Max: 37.2 (01-12-23 @ 09:00)  HR: 88 (01-13-23 @ 01:10) (87 - 100)  BP: 134/72 (01-13-23 @ 01:10) (114/67 - 140/71)  RR: 11 (01-13-23 @ 01:10) (11 - 20)  SpO2: 99% (01-13-23 @ 01:10) (98% - 100%)    Gen: NAD, AOx3  Abdomen: soft, nondistended, appropriately tender; clean dry and intact incisions  VE: vaginal spotting noted  : romero in place, draining clear yellow urine  Extrem: no calf tenderness or edema     Labs:                         7.8    7.22  )-----------( 179      ( 12 Jan 2023 02:35 )             23.8     01-11    138  |  103  |  10.6  ----------------------------<  121<H>  4.1   |  21.0<L>  |  0.53    Ca    8.6      11 Jan 2023 20:00    TPro  6.7  /  Alb  4.1  /  TBili  0.4  /  DBili  x   /  AST  20  /  ALT  16  /  AlkPhos  75  01-11            TRACY KATZ is a 31yo now POD#1 s/p TLH, BS, cysto    Patient was seen and examined at bedside. No acute events O/N.  Pain is controlled with toradol and oxy O/N  Tolerating PO diet, denies N/V.   Not yet ambulating  + flatus/-BM/+ voiding    T(C): 36.8 (01-13-23 @ 01:10), Max: 37.2 (01-12-23 @ 09:00)  HR: 88 (01-13-23 @ 01:10) (87 - 100)  BP: 134/72 (01-13-23 @ 01:10) (114/67 - 140/71)  RR: 11 (01-13-23 @ 01:10) (11 - 20)  SpO2: 99% (01-13-23 @ 01:10) (98% - 100%)    Gen: NAD, AOx3  Abdomen: soft, nondistended, appropriately tender; clean dry and intact incisions  VE: vaginal spotting noted  : romero in place, draining clear yellow urine  Extrem: no calf tenderness or edema     Labs:                         7.8    7.22  )-----------( 179      ( 12 Jan 2023 02:35 )             23.8     01-11    138  |  103  |  10.6  ----------------------------<  121<H>  4.1   |  21.0<L>  |  0.53    Ca    8.6      11 Jan 2023 20:00    TPro  6.7  /  Alb  4.1  /  TBili  0.4  /  DBili  x   /  AST  20  /  ALT  16  /  AlkPhos  75  01-11            TRACY KATZ is a 31yo now POD#1 s/p TLH, BS, cysto    Patient was seen and examined at bedside. No acute events O/N.  Pain is moderately controlled with toradol and oxy O/N, can request stronger dose of oxy prn   Has not yet attempted PO intake, denies N/V.   Not yet ambulating  - flatus/-BM/+ voiding    T(C): 36.8 (01-13-23 @ 01:10), Max: 37.2 (01-12-23 @ 09:00)  HR: 88 (01-13-23 @ 01:10) (87 - 100)  BP: 134/72 (01-13-23 @ 01:10) (114/67 - 140/71)  RR: 11 (01-13-23 @ 01:10) (11 - 20)  SpO2: 99% (01-13-23 @ 01:10) (98% - 100%)    Gen: NAD, AOx3  Abdomen: soft, nondistended, moderately tender; clean dry and intact incisions  VE: no vaginal spotting noted  Extrem: no calf tenderness or edema     Labs:                         7.8    7.22  )-----------( 179      ( 12 Jan 2023 02:35 )             23.8     01-11    138  |  103  |  10.6  ----------------------------<  121<H>  4.1   |  21.0<L>  |  0.53    Ca    8.6      11 Jan 2023 20:00    TPro  6.7  /  Alb  4.1  /  TBili  0.4  /  DBili  x   /  AST  20  /  ALT  16  /  AlkPhos  75  01-11            TRACY KATZ is a 31yo now POD#1 s/p TLH, BS, cysto    Patient was seen and examined at bedside. No acute events O/N.  Pain is moderately controlled with toradol and oxy O/N  Has not yet attempted PO intake, denies N/V.   Not yet ambulating  - flatus/-BM/+ voiding    T(C): 36.8 (01-13-23 @ 01:10), Max: 37.2 (01-12-23 @ 09:00)  HR: 88 (01-13-23 @ 01:10) (87 - 100)  BP: 134/72 (01-13-23 @ 01:10) (114/67 - 140/71)  RR: 11 (01-13-23 @ 01:10) (11 - 20)  SpO2: 99% (01-13-23 @ 01:10) (98% - 100%)    Gen: NAD, AOx3  Abdomen: soft, nondistended, moderately tender; clean dry and intact incisions  VE: no vaginal spotting noted  Extrem: no calf tenderness or edema     Labs:                         7.8    7.22  )-----------( 179      ( 12 Jan 2023 02:35 )             23.8                           8.5    9.56  )-----------( 181      ( 13 Jan 2023 05:36 )             25.8     01-11    138  |  103  |  10.6  ----------------------------<  121<H>  4.1   |  21.0<L>  |  0.53    Ca    8.6      11 Jan 2023 20:00    TPro  6.7  /  Alb  4.1  /  TBili  0.4  /  DBili  x   /  AST  20  /  ALT  16  /  AlkPhos  75  01-11            TRACY KATZ is a 33yo now POD#1 s/p TLH, BS, cysto    Patient was seen and examined at bedside. No acute events O/N.  Pain is moderately controlled with toradol and oxy O/N  Has not yet attempted PO intake, denies N/V.   Not yet ambulating  - flatus/-BM/+ voiding    T(C): 36.8 (01-13-23 @ 01:10), Max: 37.2 (01-12-23 @ 09:00)  HR: 88 (01-13-23 @ 01:10) (87 - 100)  BP: 134/72 (01-13-23 @ 01:10) (114/67 - 140/71)  RR: 11 (01-13-23 @ 01:10) (11 - 20)  SpO2: 99% (01-13-23 @ 01:10) (98% - 100%)    Gen: NAD, AOx3  Abdomen: soft, nondistended, moderately tender; clean dry and intact incisions  VE: no vaginal spotting noted  Extrem: no calf tenderness or edema     Labs:                         7.8    7.22  )-----------( 179      ( 12 Jan 2023 02:35 )             23.8                           8.5    9.56  )-----------( 181      ( 13 Jan 2023 05:36 )             25.8

## 2023-01-13 NOTE — DISCHARGE NOTE NURSING/CASE MANAGEMENT/SOCIAL WORK - PATIENT PORTAL LINK FT
You can access the FollowMyHealth Patient Portal offered by [facility] by registering at the following website: [email or web address]. By joining [employer]’s FollowMyHealth portal, you will also be able to view your health information using other applications (apps) compatible with our system.

## 2023-01-13 NOTE — PROGRESS NOTE ADULT - ATTENDING COMMENTS
Patient seen and examined, agree with above. Patient with generalized abdominal pain, only moderately controlled. Vitals normal, exam soft, appropriately tender, non-distended, H&H appropriate for post-op. Pain likely routine for post-op, as well as pain from pneumoperitoneum. Will adjust pain medication regimen, continue to monitor this morning, allow to attempt regular diet, and encourage ambulation (pt already ambulating some and voiding spontaneously). Anticipate discharge this afternoon.    Immanuel Schneider MD

## 2023-01-13 NOTE — DISCHARGE NOTE NURSING/CASE MANAGEMENT/SOCIAL WORK - NSDCPEFALRISK_GEN_ALL_CORE
For information on Fall & Injury Prevention, visit: [email or web address] OR  [email or web address] OR  https://www.cdc.gov/steadi/patient.html

## 2023-01-13 NOTE — PROGRESS NOTE ADULT - ASSESSMENT
A/P: TRACY KATZ is a 33yo now POD#0 s/p TLH, BS, cysto    Gen: VSS  Neuro: Pain well controlled on current regimen  CV: normotensive, will c/w monitoring  Pulm: incentive spirometer use encouraged  GI: Bowel sounds normal, tolerating PO diet  : pending AM TOV  Heme: AM labs pending  ID: afebrile, s/p perioperative abx  DVT ppx: ambulation encouraged, SCDs when in bed, hep 5000u BID  Dispo: for discharge home today pending stable am labs     A/P: TRACY KATZ is a 33yo now POD#1 s/p TLH, BS, cysto    Gen: VSS  Neuro: Pain well controlled on current regimen  CV: normotensive, will c/w monitoring  Pulm: incentive spirometer use encouraged  GI: Bowel sounds normal, tolerating PO diet  : pending AM TOV  Heme: AM labs pending  ID: afebrile, s/p perioperative abx  DVT ppx: ambulation encouraged, SCDs when in bed, hep 5000u BID  Dispo: for discharge home today pending stable am labs     A/P: TRACY KATZ is a 33yo now POD#1 s/p TLH, BS, cysto    Gen: VSS  Neuro: Pain well controlled on current regimen  CV: normotensive, will c/w monitoring  Pulm: incentive spirometer use encouraged  GI: Bowel sounds normal, tolerating PO diet  : pending AM TOV  Heme: 9.6 on admission > 7.8 HD1 > POD1 labs pending  ID: afebrile, s/p perioperative abx  DVT ppx: ambulation encouraged, SCDs when in bed, hep 5000u BID  Dispo: for discharge home today pending stable am labs     A/P: TRACY KATZ is a 31yo now POD#1 s/p TLH, BS, cysto    Gen: VSS  Neuro: Pain is moderately controlled on current regimen  CV: normotensive, will c/w monitoring  Pulm: incentive spirometer use encouraged  GI: Bowel sounds normal, tolerating PO diet  : Voiding  spontaneously   Heme: 9.6 on admission > 7.8 HD1 > POD1 labs pending  ID: afebrile, s/p perioperative abx  DVT ppx: ambulation encouraged, SCDs when in bed, hep 5000u BID  Dispo: for discharge home today pending attending approval and pain management    A/P: TRACY KATZ is a 31yo now POD#1 s/p TLH, BS, cysto    Gen: VSS  Neuro: Pain is moderately controlled on current regimen, c/w oxy 5 as needed for breakthrough pain  CV: normotensive, will c/w monitoring  Pulm: incentive spirometer use encouraged  GI: pending flatus, for regular diet  : Voiding  spontaneously   Heme: 9.6 on admission > 7.8 HD1 > 2u pRBCs pre op > 8.5 POD1  ID: afebrile, s/p doxycycline preop  DVT ppx: ambulation encouraged, SCDs when in bed, hep 5000u BID  Dispo: for discharge home today pending pain management and continued post op improvement   A/P: TRACY KATZ is a 33yo now POD#1 s/p TLH, BS, cysto    Gen: VSS  Neuro: Pain is moderately controlled on current regimen, c/w oxy 5 as needed for breakthrough pain  CV: normotensive, will c/w monitoring  Pulm: incentive spirometer use encouraged  GI: pending flatus, for regular diet  : Voiding  spontaneously   Heme: 9.6 on admission > 7.8 HD1 > 2u pRBCs pre op > 8.5 POD1  ID: afebrile  DVT ppx: ambulation encouraged, SCDs when in bed, hep 5000u BID  Dispo: for discharge home today pending pain management and continued post op improvement

## 2023-01-25 LAB — SURGICAL PATHOLOGY STUDY: SIGNIFICANT CHANGE UP

## 2023-01-26 ENCOUNTER — NON-APPOINTMENT (OUTPATIENT)
Age: 33
End: 2023-01-26

## 2023-01-26 ENCOUNTER — APPOINTMENT (OUTPATIENT)
Dept: OBGYN | Facility: CLINIC | Age: 33
End: 2023-01-26
Payer: COMMERCIAL

## 2023-01-26 VITALS
DIASTOLIC BLOOD PRESSURE: 100 MMHG | HEIGHT: 68 IN | SYSTOLIC BLOOD PRESSURE: 140 MMHG | BODY MASS INDEX: 28.79 KG/M2 | WEIGHT: 190 LBS

## 2023-01-26 DIAGNOSIS — F41.9 ANXIETY DISORDER, UNSPECIFIED: ICD-10-CM

## 2023-01-26 DIAGNOSIS — F32.A ANXIETY DISORDER, UNSPECIFIED: ICD-10-CM

## 2023-01-26 DIAGNOSIS — Z80.3 FAMILY HISTORY OF MALIGNANT NEOPLASM OF BREAST: ICD-10-CM

## 2023-01-26 DIAGNOSIS — Z78.9 OTHER SPECIFIED HEALTH STATUS: ICD-10-CM

## 2023-01-26 DIAGNOSIS — O00.80 OTHER. ECTOPIC PREGNANCY WITHOUT INTRAUTERINE PREGNANCY: ICD-10-CM

## 2023-01-26 DIAGNOSIS — Z80.41 FAMILY HISTORY OF MALIGNANT NEOPLASM OF OVARY: ICD-10-CM

## 2023-01-26 PROCEDURE — 99024 POSTOP FOLLOW-UP VISIT: CPT

## 2023-01-27 PROBLEM — O00.80: Status: ACTIVE | Noted: 2023-01-27

## 2023-01-27 PROBLEM — Z80.41 FAMILY HISTORY OF MALIGNANT NEOPLASM OF OVARY: Status: ACTIVE | Noted: 2023-01-26

## 2023-01-27 PROBLEM — F41.9 ANXIETY AND DEPRESSION: Status: ACTIVE | Noted: 2023-01-27

## 2023-01-27 PROBLEM — Z78.9 EXERCISES OCCASIONALLY: Status: ACTIVE | Noted: 2023-01-26

## 2023-01-27 PROBLEM — Z78.9 CURRENT NON-SMOKER: Status: ACTIVE | Noted: 2023-01-26

## 2023-01-27 PROBLEM — Z80.3 FAMILY HISTORY OF MALIGNANT NEOPLASM OF BREAST: Status: ACTIVE | Noted: 2023-01-26

## 2023-01-27 RX ORDER — VENLAFAXINE HCL 75 MG
75 TABLET ORAL
Refills: 0 | Status: ACTIVE | COMMUNITY

## 2023-01-27 NOTE — PHYSICAL EXAM
[Chaperone Present] : A chaperone was present in the examining room during all aspects of the physical examination [Soft] : soft [Non-tender] : non-tender [Non-distended] : non-distended [FreeTextEntry7] : Four 5 mm port sites all CDI [Labia Majora] : normal [Labia Minora] : normal [Normal] : normal [Absent] : absent [Uterine Adnexae] : normal [FreeTextEntry4] : Some discomfort on insertion at introitus during bimanual exam [FreeTextEntry5] : vaginal cuff intact both on visualization and palpation

## 2023-01-27 NOTE — REASON FOR VISIT
[Post-Op Visit] : a post-op visit for [FreeTextEntry2] : Laparoscopic total hysterectomy bilateral Salpingectomy & cystoscopy.

## 2023-01-27 NOTE — HISTORY OF PRESENT ILLNESS
[Patient reported PAP Smear was normal] : Patient reported PAP Smear was normal [N] : Patient reports normal menses [Y] : Positive pregnancy history [Menarche Age: ____] : age at menarche was [unfilled] [Currently Active] : currently active [Men] : men [PapSmeardate] : 2019 [LMPDate] : 11/18/2022 [de-identified] : had Total Hysterectomy  [PGxTotal] : 6 [[facility]] : 2 [PGHxAbortions] : 3 [[facility]xLiving] : 2 [PGHxABSpont] : 1 [FreeTextEntry1] : 11/18/2022

## 2023-01-27 NOTE — DISCUSSION/SUMMARY
[FreeTextEntry1] : 31 y/o  s/p TLH, b/l salpingectomy on  for  scar ectopic pregnancy recovering well\par - No sex or heavy lifting for 8 weeks from surgery\par - Can return to all other normal activity\par - Reviewed pathology results with patient\par - Letter faxed to patient's primary gyn, Dr. Rey Chester, with operative note and pathology results\par - Advised of elevated BP today and advised she follow up with PMD to manage further\par - Return in 4 weeks for cuff check\par \par Immanuel Schneider MD

## 2023-02-27 ENCOUNTER — APPOINTMENT (OUTPATIENT)
Dept: OBGYN | Facility: CLINIC | Age: 33
End: 2023-02-27
Payer: COMMERCIAL

## 2023-02-27 VITALS
HEIGHT: 68 IN | DIASTOLIC BLOOD PRESSURE: 98 MMHG | BODY MASS INDEX: 29.55 KG/M2 | SYSTOLIC BLOOD PRESSURE: 140 MMHG | WEIGHT: 195 LBS

## 2023-02-27 DIAGNOSIS — N76.0 ACUTE VAGINITIS: ICD-10-CM

## 2023-02-27 PROCEDURE — 36415 COLL VENOUS BLD VENIPUNCTURE: CPT

## 2023-02-27 PROCEDURE — 99024 POSTOP FOLLOW-UP VISIT: CPT

## 2023-02-27 RX ORDER — AMOXICILLIN AND CLAVULANATE POTASSIUM 875; 125 MG/1; MG/1
875-125 TABLET, COATED ORAL
Qty: 28 | Refills: 0 | Status: ACTIVE | COMMUNITY
Start: 2023-02-27 | End: 1900-01-01

## 2023-02-27 NOTE — CONSULT LETTER
[Dear  ___] : Dear  [unfilled], [Courtesy Letter:] : I had the pleasure of seeing your patient, [unfilled], in my office today. [Sincerely,] : Sincerely, [FreeTextEntry1] : She is overall recovering well from her TLH on 1/12 but is still having tenderness at the vaginal cuff that I am evaluating and managing further. I will be seeing her for future follow ups over the next few weeks for this and once this issue has resolved, will be returning her to your excellent care. Please see my note below. If you have any questions, please do not hesitate to contact me. [FreeTextEntry3] : Immanuel Schneider MD

## 2023-02-27 NOTE — HISTORY OF PRESENT ILLNESS
[Fever] : no fever [Chills] : no chills [Nausea] : no nausea [Vomiting] : no vomiting [Vaginal Bleeding] : no vaginal bleeding [de-identified] : 31 y/o  s/p TLH, b/l salpingectomy, cystoscopy on  for  scar pregnancy presenting for post-op visit. Pt reports she overall feels well, but still occasionally gets lower back pain, pain under her ribs, and pelvic pressure. Has been using Tylenol and ibuprofen for this as needed. Reports it feels like this discomfort has been stable since last visit, has not improved significantly. Has been able to perform all normal daily activities, has been avoiding intercourse and heavy lifting as advised. [de-identified] : Abdomen soft, non-tender, non-distended, all four port sites CDI. No tenderness to palpation of back or flanks. Vaginal cuff intact both on visualization and on inspection, but with tenderness more than expected 6w post-op at mid portion of cuff. No discharge or erythema seen.

## 2023-02-27 NOTE — PLAN
[FreeTextEntry1] : 33 y/o  s/p TLH, b/l salpingectomy on  for  scar ectopic pregnancy overall recovering well but with some persistent abdominal pain and tenderness at vaginal cuff\par - Counseled that tenderness at cuff to this degree 6w post-op is unusual and is concerning for possible cuff cellulitis, though no visual evidence of cellulitis was seen\par - Will treat possible cuff cellulitis empirically with Augmentin 875/125 BID for 14 days\par - CBC drawn today to check WBC, if significantly elevated will need imaging, if normal can continue with antibiotics and assess response\par - No sex or heavy lifting until further evaluation. Can continue all other normal activity.\par - Advised upper abdominal pain and back pain may be more MSK in nature and if related to surgical recovery should continue to improve overtime.\par - Advised of elevated BP again today and reminded to follow up with PMD after resolution of cuff symptoms to manage further\par - Return in 1w to assess for improvement in symptoms.\par \par Immanuel Schneider MD.

## 2023-02-28 LAB
BASOPHILS # BLD AUTO: 0.03 K/UL
BASOPHILS NFR BLD AUTO: 0.4 %
EOSINOPHIL # BLD AUTO: 0.03 K/UL
EOSINOPHIL NFR BLD AUTO: 0.4 %
HCT VFR BLD CALC: 41.4 %
HGB BLD-MCNC: 13.1 G/DL
IMM GRANULOCYTES NFR BLD AUTO: 0.1 %
LYMPHOCYTES # BLD AUTO: 2.64 K/UL
LYMPHOCYTES NFR BLD AUTO: 34.9 %
MAN DIFF?: NORMAL
MCHC RBC-ENTMCNC: 30.5 PG
MCHC RBC-ENTMCNC: 31.6 GM/DL
MCV RBC AUTO: 96.5 FL
MONOCYTES # BLD AUTO: 0.57 K/UL
MONOCYTES NFR BLD AUTO: 7.5 %
NEUTROPHILS # BLD AUTO: 4.29 K/UL
NEUTROPHILS NFR BLD AUTO: 56.7 %
PLATELET # BLD AUTO: 238 K/UL
RBC # BLD: 4.29 M/UL
RBC # FLD: 12.9 %
WBC # FLD AUTO: 7.57 K/UL

## 2023-03-09 ENCOUNTER — APPOINTMENT (OUTPATIENT)
Dept: OBGYN | Facility: CLINIC | Age: 33
End: 2023-03-09

## 2023-03-10 ENCOUNTER — NON-APPOINTMENT (OUTPATIENT)
Age: 33
End: 2023-03-10

## 2023-03-17 ENCOUNTER — APPOINTMENT (OUTPATIENT)
Dept: OBGYN | Facility: CLINIC | Age: 33
End: 2023-03-17

## 2023-03-31 ENCOUNTER — NON-APPOINTMENT (OUTPATIENT)
Age: 33
End: 2023-03-31

## 2023-04-17 ENCOUNTER — NON-APPOINTMENT (OUTPATIENT)
Age: 33
End: 2023-04-17

## 2023-04-19 ENCOUNTER — NON-APPOINTMENT (OUTPATIENT)
Age: 33
End: 2023-04-19

## 2023-06-05 ENCOUNTER — NON-APPOINTMENT (OUTPATIENT)
Age: 33
End: 2023-06-05

## 2023-10-06 NOTE — PATIENT PROFILE OB - POST PARTUM DEPRESSION SCREEN OB 3
Detail Level: Detailed
Pt seen, she has been sleeping since arriving to  at 0400.  Has not checked her bleeding. Feels ok. Sits up without dizziness.  Visit Vitals  /69 (BP Location: LUE - Left upper extremity, Patient Position: Supine)   Pulse 64   Temp 97.7 °F (36.5 °C) (Oral)   Resp 16   Ht 6' 2\" (1.88 m)   Wt 105 kg   SpO2 98%   BMI 29.72 kg/m²   lungs CTA  CV RRR  abd soft, not tender.   Pad on for 4 hours soaked  Ext no cce  WBC (K/mcL)   Date Value   10/06/2023 8.4     RBC (mil/mcL)   Date Value   10/06/2023 3.07 (L)     HCT (%)   Date Value   10/06/2023 26.3 (L)     HGB (g/dL)   Date Value   10/06/2023 9.0 (L)     PLT (K/mcL)   Date Value   10/06/2023 248   RN to replace pad and monitor bleeding. Bolus in last 600ml of IV fluids.  Ok for discharge home when ambulates without difficulty. Will order her diet.   Joanie Beaulieu MD   
yes

## 2024-01-29 NOTE — PATIENT PROFILE ADULT - NSPROGENOTHERPROVIDER_GEN_A_NUR
Addended by: SAMIA MARCIAL on: 1/29/2024 03:30 PM     Modules accepted: Orders    
NOTIFICATION RETURN TO WORK / SCHOOL 
 
5/15/2019 9:01 AM 
 
Ms. [address] [address] To Whom It May Concern: [address] is currently under the care of Jerad Guzman. She will return to work/school on: 5/20/19. If there are questions or concerns please have the patient contact our office. Sincerely, Harini Su MD 
 
                                
 

none

## 2024-11-23 NOTE — ED ADULT NURSE NOTE - OBJECTIVE STATEMENT
0 received pt in bed #t4 Pt A&O c/o back pain since Wednesday that radiates to flank since Thursday c/o nausea also

## 2025-01-27 ENCOUNTER — EMERGENCY (EMERGENCY)
Facility: HOSPITAL | Age: 35
LOS: 1 days | Discharge: HOME | End: 2025-01-27
Attending: EMERGENCY MEDICINE
Payer: COMMERCIAL

## 2025-01-27 VITALS
SYSTOLIC BLOOD PRESSURE: 195 MMHG | OXYGEN SATURATION: 100 % | HEIGHT: 67 IN | DIASTOLIC BLOOD PRESSURE: 127 MMHG | RESPIRATION RATE: 20 BRPM | WEIGHT: 149.91 LBS | HEART RATE: 115 BPM | TEMPERATURE: 98 F

## 2025-01-27 VITALS
RESPIRATION RATE: 18 BRPM | HEART RATE: 101 BPM | SYSTOLIC BLOOD PRESSURE: 139 MMHG | DIASTOLIC BLOOD PRESSURE: 101 MMHG | OXYGEN SATURATION: 99 % | TEMPERATURE: 98 F

## 2025-01-27 DIAGNOSIS — Z98.891 HISTORY OF UTERINE SCAR FROM PREVIOUS SURGERY: Chronic | ICD-10-CM

## 2025-01-27 DIAGNOSIS — Z98.890 OTHER SPECIFIED POSTPROCEDURAL STATES: Chronic | ICD-10-CM

## 2025-01-27 LAB
ADD ON TEST-SPECIMEN IN LAB: SIGNIFICANT CHANGE UP
ALBUMIN SERPL ELPH-MCNC: 4.7 G/DL — SIGNIFICANT CHANGE UP (ref 3.3–5)
ALP SERPL-CCNC: 96 U/L — SIGNIFICANT CHANGE UP (ref 40–120)
ALT FLD-CCNC: 24 U/L — SIGNIFICANT CHANGE UP (ref 10–45)
ANION GAP SERPL CALC-SCNC: 16 MMOL/L — SIGNIFICANT CHANGE UP (ref 5–17)
AST SERPL-CCNC: 28 U/L — SIGNIFICANT CHANGE UP (ref 10–40)
BASOPHILS # BLD AUTO: 0.03 K/UL — SIGNIFICANT CHANGE UP (ref 0–0.2)
BASOPHILS NFR BLD AUTO: 0.4 % — SIGNIFICANT CHANGE UP (ref 0–2)
BILIRUB SERPL-MCNC: 0.6 MG/DL — SIGNIFICANT CHANGE UP (ref 0.2–1.2)
BUN SERPL-MCNC: 8 MG/DL — SIGNIFICANT CHANGE UP (ref 7–23)
CALCIUM SERPL-MCNC: 9.5 MG/DL — SIGNIFICANT CHANGE UP (ref 8.4–10.5)
CHLORIDE SERPL-SCNC: 99 MMOL/L — SIGNIFICANT CHANGE UP (ref 96–108)
CO2 SERPL-SCNC: 24 MMOL/L — SIGNIFICANT CHANGE UP (ref 22–31)
CREAT SERPL-MCNC: 0.57 MG/DL — SIGNIFICANT CHANGE UP (ref 0.5–1.3)
EGFR: 121 ML/MIN/1.73M2 — SIGNIFICANT CHANGE UP
EOSINOPHIL # BLD AUTO: 0.07 K/UL — SIGNIFICANT CHANGE UP (ref 0–0.5)
EOSINOPHIL NFR BLD AUTO: 0.9 % — SIGNIFICANT CHANGE UP (ref 0–6)
FLUAV AG NPH QL: SIGNIFICANT CHANGE UP
FLUBV AG NPH QL: SIGNIFICANT CHANGE UP
GLUCOSE SERPL-MCNC: 107 MG/DL — HIGH (ref 70–99)
HCT VFR BLD CALC: 42.4 % — SIGNIFICANT CHANGE UP (ref 34.5–45)
HGB BLD-MCNC: 14.3 G/DL — SIGNIFICANT CHANGE UP (ref 11.5–15.5)
IMM GRANULOCYTES NFR BLD AUTO: 0.3 % — SIGNIFICANT CHANGE UP (ref 0–0.9)
LYMPHOCYTES # BLD AUTO: 2.23 K/UL — SIGNIFICANT CHANGE UP (ref 1–3.3)
LYMPHOCYTES # BLD AUTO: 29.1 % — SIGNIFICANT CHANGE UP (ref 13–44)
MCHC RBC-ENTMCNC: 31.6 PG — SIGNIFICANT CHANGE UP (ref 27–34)
MCHC RBC-ENTMCNC: 33.7 G/DL — SIGNIFICANT CHANGE UP (ref 32–36)
MCV RBC AUTO: 93.6 FL — SIGNIFICANT CHANGE UP (ref 80–100)
MONOCYTES # BLD AUTO: 0.46 K/UL — SIGNIFICANT CHANGE UP (ref 0–0.9)
MONOCYTES NFR BLD AUTO: 6 % — SIGNIFICANT CHANGE UP (ref 2–14)
NEUTROPHILS # BLD AUTO: 4.86 K/UL — SIGNIFICANT CHANGE UP (ref 1.8–7.4)
NEUTROPHILS NFR BLD AUTO: 63.3 % — SIGNIFICANT CHANGE UP (ref 43–77)
NRBC # BLD: 0 /100 WBCS — SIGNIFICANT CHANGE UP (ref 0–0)
PLATELET # BLD AUTO: 209 K/UL — SIGNIFICANT CHANGE UP (ref 150–400)
POTASSIUM SERPL-MCNC: 3.4 MMOL/L — LOW (ref 3.5–5.3)
POTASSIUM SERPL-SCNC: 3.4 MMOL/L — LOW (ref 3.5–5.3)
PROT SERPL-MCNC: 7.8 G/DL — SIGNIFICANT CHANGE UP (ref 6–8.3)
RBC # BLD: 4.53 M/UL — SIGNIFICANT CHANGE UP (ref 3.8–5.2)
RBC # FLD: 12.6 % — SIGNIFICANT CHANGE UP (ref 10.3–14.5)
RSV RNA NPH QL NAA+NON-PROBE: SIGNIFICANT CHANGE UP
SARS-COV-2 RNA SPEC QL NAA+PROBE: SIGNIFICANT CHANGE UP
SODIUM SERPL-SCNC: 139 MMOL/L — SIGNIFICANT CHANGE UP (ref 135–145)
TSH SERPL-MCNC: 2.39 UIU/ML — SIGNIFICANT CHANGE UP (ref 0.27–4.2)
WBC # BLD: 7.67 K/UL — SIGNIFICANT CHANGE UP (ref 3.8–10.5)
WBC # FLD AUTO: 7.67 K/UL — SIGNIFICANT CHANGE UP (ref 3.8–10.5)

## 2025-01-27 PROCEDURE — 84443 ASSAY THYROID STIM HORMONE: CPT

## 2025-01-27 PROCEDURE — 82962 GLUCOSE BLOOD TEST: CPT

## 2025-01-27 PROCEDURE — 36000 PLACE NEEDLE IN VEIN: CPT

## 2025-01-27 PROCEDURE — 84702 CHORIONIC GONADOTROPIN TEST: CPT

## 2025-01-27 PROCEDURE — 99284 EMERGENCY DEPT VISIT MOD MDM: CPT

## 2025-01-27 PROCEDURE — 93005 ELECTROCARDIOGRAM TRACING: CPT

## 2025-01-27 PROCEDURE — 93010 ELECTROCARDIOGRAM REPORT: CPT

## 2025-01-27 PROCEDURE — 99284 EMERGENCY DEPT VISIT MOD MDM: CPT | Mod: 25

## 2025-01-27 PROCEDURE — 87637 SARSCOV2&INF A&B&RSV AMP PRB: CPT

## 2025-01-27 PROCEDURE — 85025 COMPLETE CBC W/AUTO DIFF WBC: CPT

## 2025-01-27 PROCEDURE — 80053 COMPREHEN METABOLIC PANEL: CPT

## 2025-01-27 RX ORDER — SODIUM CHLORIDE 9 MG/ML
1000 INJECTION, SOLUTION INTRAMUSCULAR; INTRAVENOUS; SUBCUTANEOUS ONCE
Refills: 0 | Status: COMPLETED | OUTPATIENT
Start: 2025-01-27 | End: 2025-01-27

## 2025-01-27 RX ADMIN — SODIUM CHLORIDE 1000 MILLILITER(S): 9 INJECTION, SOLUTION INTRAMUSCULAR; INTRAVENOUS; SUBCUTANEOUS at 13:22

## 2025-01-27 NOTE — ED PROVIDER NOTE - ATTENDING CONTRIBUTION TO CARE
vague sxs which she attributes to HTN, no cp/sob/pleuritic/leg pain/vomiting/ha  rrr wo murmur, clear lungs, non-tender abdominal, no leg swelling no drift , cn nl  pt took xanax w much relief. ivf/ tachy improved. check preg / cbc/cmp/flu swab. needs to f/u w personal medical doctor for htn treatment - bp improved as well though not nl range.

## 2025-01-27 NOTE — ED PROVIDER NOTE - CLINICAL SUMMARY MEDICAL DECISION MAKING FREE TEXT BOX
34 year old woman PMH depression on effexor, previous hypertension currently off medication presenting with lightheadedness and hypertension, no chest pain or SOB, no focal neuro deficits, concerning for panic attack vs viral syndrome, less likely hyperthyroidism vs serotonin syndrome, unlikely ACS (no chest pain) vs stroke (no neuro deficits, no CN deficits, diplopia, vertigo, gait disturbance). cbc, cmp, hcg, tsh, fluids, flu/covid reassess

## 2025-01-27 NOTE — ED ADULT NURSE NOTE - OBJECTIVE STATEMENT
Pt is a 35 yo F w/ PMHx of anxiety and depression complaining of tingling extremities and lightheadedness. Pt reports she was walking to work and felt like she was having a panic attack but eventually felt lightheaded which she hasn't experienced before in past attacks. Pt denies headache and vision changes. Pt endorses tingling sensation to lower extremities. Pt denies any pain. Pt is aox4, airway clear and patent, PERRL, equal chest rise and fall, pulses intact, skin warm and dry, abd non tender and non distended x4 quadrants, motor and sensory skills intact.. Pt placed in locked lowered stretcher w/ rails raised.

## 2025-01-27 NOTE — ED PROVIDER NOTE - NSFOLLOWUPINSTRUCTIONS_ED_ALL_ED_FT
You were seen in the Emergency Department for lightheadedness. your symptoms improved with fluids and your blood pressure and heart rate improved as well. There are many things that could be contributing to this, such as an underlying infection like the flu, a thyroid problem, or related to your effexor. Your bloodwork here was normal, but some lab results are pending and you will get a call if they are abnormal. Please follow up with your primary care doctor.     Return to the Emergency Department if you experience passing out, chest pain, difficulty breathing, severe headache, blurry vision, slurred speech, and weakness or numbness on one side of the body.

## 2025-01-27 NOTE — ED ADULT NURSE REASSESSMENT NOTE - NS ED NURSE REASSESS COMMENT FT1
Fluids finished, Dr Silva states OK to DC patient now that fluids are done with BP elevated. Patient denies headache, dizziness, vision changes, blurry or double vision. Patient aware of blood pressure and educated on signs to look out for such as headache, dizzienss, vision changes and urged to follow up with her PMD. Patient expresses understanding and is comfortable with the discharge.

## 2025-01-27 NOTE — ED PROVIDER NOTE - OBJECTIVE STATEMENT
34 year old woman PMH depression on effexor, previous hypertension currently off medication presenting with lightheadedness and hypertension. She was at work about an hour PTA and suddenly felt very lightheaded, she works at a doctors office and they took her BP which was in the 180s/190s. She reports some generalized tingling in her entire body without focality, denies fevers, chills, chest pain, SOB, abd pain, N/v/d/c, dysuria, weakness, numbness, double vision, vertigo, trouble walking. Took one of her prescribed xanax after symptoms started with minimal relief.

## 2025-01-27 NOTE — ED PROVIDER NOTE - PATIENT PORTAL LINK FT
You can access the FollowMyHealth Patient Portal offered by [facility] by registering at the following website: [email or web address]. By joining [employer]’s FollowMyHealth portal, you will also be able to view your health information using other applications (apps) compatible with our system.

## 2025-01-27 NOTE — ED PROVIDER NOTE - PHYSICAL EXAMINATION
Physical Exam:  Gen: no acute distress, AO3, nontoxic appearing, able to ambulate without assistance with normal gait  Head: NCAT  HEENT: EOMI, PEERLA, normal conjunctiva, tongue midline, oral mucosa moist  Lung: CTAB, no respiratory distress, no wheezes/rhonchi/rales B/L, speaking in full sentences  CV: RRR, no murmurs, rubs or gallops  Abd: soft, NT, ND, no guarding, no rigidity, no rebound tenderness, no CVA tenderness  MSK: no visible deformities, ROM normal in UE/LE, no neck / back pain, calf tenderness  Neuro: No focal sensory or motor deficits in cranial nerves, upper and lower extremities, no cerebellar deficits, reflexes 3+ diffusely  Skin: Warm, well perfused, no rash, no leg swelling

## 2025-01-27 NOTE — ED PROVIDER NOTE - PROGRESS NOTE DETAILS
Ricardo JACKSON PGY1: Patient feeling better s/p fluids, bloodpressure and heart rate imprved, lightheadedness gone. Discussed return precautions, follow up.

## (undated) DEVICE — POSITIONER PINK PAD PIGAZZI SYSTEM

## (undated) DEVICE — BLADE SURGICAL #10 CARBON

## (undated) DEVICE — SYR CONTROL LUER LOK 10CC

## (undated) DEVICE — SUT VICRYL 0 27" CT-2 UNDYED

## (undated) DEVICE — Device

## (undated) DEVICE — VENODYNE/SCD SLEEVE CALF MEDIUM

## (undated) DEVICE — PACK GENERAL LAPAROSCOPY

## (undated) DEVICE — INSUFFLATION NDL COVIDIEN STEP 14G FOR STEP/VERSASTEP

## (undated) DEVICE — TUBING INSUFFLATION LAP FILTER 10FT

## (undated) DEVICE — BLADE SURGICAL #15 CARBON

## (undated) DEVICE — SUT MONOCRYL 4-0 27" PS-2 UNDYED

## (undated) DEVICE — SYR LUER LOK 10CC

## (undated) DEVICE — DRAPE LAVH 124" X 30" X125"

## (undated) DEVICE — SSH-ESU BOVIE MACHINE T7E14835DX: Type: DURABLE MEDICAL EQUIPMENT

## (undated) DEVICE — TUBING STRYKEFLOW II SUCTION / IRRIGATOR

## (undated) DEVICE — LIGASURE MARYLAND 37CM

## (undated) DEVICE — SUT VICRYL 0 27" UR-6

## (undated) DEVICE — TROCAR COVIDIEN VERSAPORT BLADELESS OPTICAL 11MM STANDARD

## (undated) DEVICE — DRAPE TOWEL BLUE 17" X 24"

## (undated) DEVICE — FOLEY TRAY 16FR 5CC LF UMETER CLOSED

## (undated) DEVICE — TROCAR COVIDIEN VERSAPORT BLADELESS OPTICAL 5MM STANDARD

## (undated) DEVICE — SOL IRR BAG NS 0.9% 3000ML

## (undated) DEVICE — GLV 7.5 PROTEXIS (WHITE)

## (undated) DEVICE — WARMING BLANKET UPPER ADULT

## (undated) DEVICE — LIGASURE BLUNT TIP 37CM

## (undated) DEVICE — SUT VICRYL PLUS 4-0 18" PS-2 UNDYED